# Patient Record
Sex: FEMALE | Race: WHITE | Employment: FULL TIME | URBAN - METROPOLITAN AREA
[De-identification: names, ages, dates, MRNs, and addresses within clinical notes are randomized per-mention and may not be internally consistent; named-entity substitution may affect disease eponyms.]

---

## 2017-03-16 ENCOUNTER — NURSE ONLY (OUTPATIENT)
Dept: PRIMARY CARE CLINIC | Age: 47
End: 2017-03-16

## 2017-03-16 DIAGNOSIS — J30.9 ALLERGIC RHINITIS, UNSPECIFIED ALLERGIC RHINITIS TRIGGER, UNSPECIFIED RHINITIS SEASONALITY: Primary | ICD-10-CM

## 2017-03-16 PROCEDURE — 96372 THER/PROPH/DIAG INJ SC/IM: CPT | Performed by: FAMILY MEDICINE

## 2017-03-16 RX ORDER — TRIAMCINOLONE ACETONIDE 40 MG/ML
80 INJECTION, SUSPENSION INTRA-ARTICULAR; INTRAMUSCULAR ONCE
Status: COMPLETED | OUTPATIENT
Start: 2017-03-16 | End: 2017-03-16

## 2017-03-16 RX ADMIN — TRIAMCINOLONE ACETONIDE 80 MG: 40 INJECTION, SUSPENSION INTRA-ARTICULAR; INTRAMUSCULAR at 15:54

## 2017-07-25 ENCOUNTER — OFFICE VISIT (OUTPATIENT)
Dept: PRIMARY CARE CLINIC | Age: 47
End: 2017-07-25

## 2017-07-25 VITALS
HEART RATE: 88 BPM | HEIGHT: 68 IN | RESPIRATION RATE: 16 BRPM | DIASTOLIC BLOOD PRESSURE: 80 MMHG | WEIGHT: 153 LBS | SYSTOLIC BLOOD PRESSURE: 114 MMHG | BODY MASS INDEX: 23.19 KG/M2 | TEMPERATURE: 97.5 F

## 2017-07-25 DIAGNOSIS — B00.1 RECURRENT COLD SORES: ICD-10-CM

## 2017-07-25 DIAGNOSIS — F32.A DEPRESSION, UNSPECIFIED DEPRESSION TYPE: ICD-10-CM

## 2017-07-25 DIAGNOSIS — M54.50 CHRONIC BILATERAL LOW BACK PAIN WITHOUT SCIATICA: ICD-10-CM

## 2017-07-25 DIAGNOSIS — G89.29 CHRONIC BILATERAL LOW BACK PAIN WITHOUT SCIATICA: ICD-10-CM

## 2017-07-25 DIAGNOSIS — F41.9 ANXIETY: Primary | ICD-10-CM

## 2017-07-25 PROCEDURE — 99214 OFFICE O/P EST MOD 30 MIN: CPT | Performed by: FAMILY MEDICINE

## 2017-07-25 PROCEDURE — G0444 DEPRESSION SCREEN ANNUAL: HCPCS | Performed by: FAMILY MEDICINE

## 2017-07-25 RX ORDER — VALACYCLOVIR HYDROCHLORIDE 1 G/1
1000 TABLET, FILM COATED ORAL 2 TIMES DAILY
Qty: 20 TABLET | Refills: 3 | Status: SHIPPED | OUTPATIENT
Start: 2017-07-25 | End: 2018-02-12 | Stop reason: SDUPTHER

## 2017-07-25 RX ORDER — METHOCARBAMOL 500 MG/1
500 TABLET, FILM COATED ORAL 4 TIMES DAILY
Qty: 90 TABLET | Refills: 3 | Status: SHIPPED | OUTPATIENT
Start: 2017-07-25 | End: 2019-03-11 | Stop reason: SDUPTHER

## 2017-07-25 RX ORDER — BUSPIRONE HYDROCHLORIDE 7.5 MG/1
7.5 TABLET ORAL 2 TIMES DAILY
Qty: 6 TABLET | Refills: 0 | Status: SHIPPED | OUTPATIENT
Start: 2017-07-25 | End: 2017-08-30 | Stop reason: SDUPTHER

## 2017-07-25 RX ORDER — BUSPIRONE HYDROCHLORIDE 15 MG/1
15 TABLET ORAL 3 TIMES DAILY PRN
Qty: 90 TABLET | Refills: 3 | Status: SHIPPED | OUTPATIENT
Start: 2017-07-25 | End: 2018-02-19 | Stop reason: ALTCHOICE

## 2017-07-25 ASSESSMENT — ENCOUNTER SYMPTOMS
NAUSEA: 0
PHOTOPHOBIA: 0
COLOR CHANGE: 0
CHEST TIGHTNESS: 0
WHEEZING: 0
FACIAL SWELLING: 0
EYE REDNESS: 0
STRIDOR: 0
EYE PAIN: 0
APNEA: 0
CONSTIPATION: 0
ABDOMINAL PAIN: 0
EYE DISCHARGE: 0
CHOKING: 0
VISUAL CHANGE: 0
DIARRHEA: 0

## 2017-07-25 ASSESSMENT — PATIENT HEALTH QUESTIONNAIRE - PHQ9
5. POOR APPETITE OR OVEREATING: 3
8. MOVING OR SPEAKING SO SLOWLY THAT OTHER PEOPLE COULD HAVE NOTICED. OR THE OPPOSITE, BEING SO FIGETY OR RESTLESS THAT YOU HAVE BEEN MOVING AROUND A LOT MORE THAN USUAL: 3
6. FEELING BAD ABOUT YOURSELF - OR THAT YOU ARE A FAILURE OR HAVE LET YOURSELF OR YOUR FAMILY DOWN: 1
9. THOUGHTS THAT YOU WOULD BE BETTER OFF DEAD, OR OF HURTING YOURSELF: 0
SUM OF ALL RESPONSES TO PHQ9 QUESTIONS 1 & 2: 4
4. FEELING TIRED OR HAVING LITTLE ENERGY: 3
1. LITTLE INTEREST OR PLEASURE IN DOING THINGS: 1
7. TROUBLE CONCENTRATING ON THINGS, SUCH AS READING THE NEWSPAPER OR WATCHING TELEVISION: 2
SUM OF ALL RESPONSES TO PHQ QUESTIONS 1-9: 19
2. FEELING DOWN, DEPRESSED OR HOPELESS: 3
3. TROUBLE FALLING OR STAYING ASLEEP: 3
10. IF YOU CHECKED OFF ANY PROBLEMS, HOW DIFFICULT HAVE THESE PROBLEMS MADE IT FOR YOU TO DO YOUR WORK, TAKE CARE OF THINGS AT HOME, OR GET ALONG WITH OTHER PEOPLE: 3

## 2017-08-30 DIAGNOSIS — F41.9 ANXIETY: ICD-10-CM

## 2017-08-30 DIAGNOSIS — F32.A DEPRESSION, UNSPECIFIED DEPRESSION TYPE: ICD-10-CM

## 2017-08-30 RX ORDER — BUSPIRONE HYDROCHLORIDE 7.5 MG/1
TABLET ORAL
Qty: 60 TABLET | Refills: 3 | Status: SHIPPED | OUTPATIENT
Start: 2017-08-30 | End: 2018-02-19 | Stop reason: DRUGHIGH

## 2018-01-11 ENCOUNTER — OFFICE VISIT (OUTPATIENT)
Dept: PRIMARY CARE CLINIC | Age: 48
End: 2018-01-11

## 2018-01-11 VITALS
HEIGHT: 67 IN | TEMPERATURE: 97.8 F | OXYGEN SATURATION: 97 % | DIASTOLIC BLOOD PRESSURE: 76 MMHG | BODY MASS INDEX: 25.9 KG/M2 | HEART RATE: 80 BPM | SYSTOLIC BLOOD PRESSURE: 112 MMHG | WEIGHT: 165 LBS | RESPIRATION RATE: 16 BRPM

## 2018-01-11 DIAGNOSIS — F32.A DEPRESSION, UNSPECIFIED DEPRESSION TYPE: ICD-10-CM

## 2018-01-11 DIAGNOSIS — R63.5 WEIGHT GAIN: ICD-10-CM

## 2018-01-11 DIAGNOSIS — F41.9 ANXIETY: Primary | ICD-10-CM

## 2018-01-11 DIAGNOSIS — R19.6 HALITOSIS: ICD-10-CM

## 2018-01-11 DIAGNOSIS — K21.9 GASTROESOPHAGEAL REFLUX DISEASE WITHOUT ESOPHAGITIS: ICD-10-CM

## 2018-01-11 DIAGNOSIS — R07.89 ATYPICAL CHEST PAIN: ICD-10-CM

## 2018-01-11 DIAGNOSIS — E78.5 DYSLIPIDEMIA: ICD-10-CM

## 2018-01-11 PROCEDURE — 99214 OFFICE O/P EST MOD 30 MIN: CPT | Performed by: FAMILY MEDICINE

## 2018-01-11 RX ORDER — PANTOPRAZOLE SODIUM 40 MG/1
40 TABLET, DELAYED RELEASE ORAL DAILY
Qty: 30 TABLET | Refills: 0 | Status: SHIPPED | OUTPATIENT
Start: 2018-01-11 | End: 2018-02-19

## 2018-01-11 RX ORDER — PHENTERMINE HYDROCHLORIDE 37.5 MG/1
37.5 CAPSULE ORAL EVERY MORNING
Qty: 30 CAPSULE | Refills: 0 | Status: SHIPPED | OUTPATIENT
Start: 2018-01-11 | End: 2018-02-19 | Stop reason: SDUPTHER

## 2018-01-11 RX ORDER — VENLAFAXINE HYDROCHLORIDE 75 MG/1
75 CAPSULE, EXTENDED RELEASE ORAL DAILY
Qty: 30 CAPSULE | Refills: 1 | Status: SHIPPED | OUTPATIENT
Start: 2018-01-11 | End: 2018-02-19 | Stop reason: DRUGHIGH

## 2018-01-11 RX ORDER — VENLAFAXINE HYDROCHLORIDE 37.5 MG/1
37.5 CAPSULE, EXTENDED RELEASE ORAL DAILY
Qty: 7 CAPSULE | Refills: 0 | Status: SHIPPED | OUTPATIENT
Start: 2018-01-11 | End: 2018-02-19 | Stop reason: DRUGHIGH

## 2018-01-11 ASSESSMENT — ENCOUNTER SYMPTOMS
EYE DISCHARGE: 0
FACIAL SWELLING: 0
ABDOMINAL PAIN: 1
PHOTOPHOBIA: 0
NAUSEA: 0
CONSTIPATION: 0
CHOKING: 0
EYE REDNESS: 0
WHEEZING: 0
DIARRHEA: 0
CHEST TIGHTNESS: 0
STRIDOR: 0
APNEA: 0
EYE PAIN: 0
COLOR CHANGE: 0

## 2018-01-11 NOTE — PROGRESS NOTES
exudate. Eyes: Conjunctivae and EOM are normal. Pupils are equal, round, and reactive to light. Right eye exhibits no discharge. Left eye exhibits no discharge. No scleral icterus. Neck: Normal range of motion. Neck supple. No tracheal deviation present. No thyromegaly present. Cardiovascular: Normal rate, regular rhythm, normal heart sounds and intact distal pulses. Exam reveals no gallop and no friction rub. No murmur heard. Pulmonary/Chest: Effort normal and breath sounds normal. No respiratory distress. She has no wheezes. She has no rales. She exhibits no tenderness. Abdominal: Soft. Bowel sounds are normal. She exhibits no distension and no mass. There is no tenderness. There is no rebound and no guarding. Musculoskeletal: She exhibits no edema. Lymphadenopathy:     She has no cervical adenopathy. Neurological: She is alert and oriented to person, place, and time. Coordination normal.   Skin: Skin is warm and dry. No rash noted. She is not diaphoretic. No erythema. Psychiatric: She has a normal mood and affect. Her behavior is normal. Judgment and thought content normal.   Nursing note and vitals reviewed. Assessment:     1. Anxiety  venlafaxine (EFFEXOR XR) 37.5 MG extended release capsule    venlafaxine (EFFEXOR XR) 75 MG extended release capsule   2. Depression, unspecified depression type  venlafaxine (EFFEXOR XR) 37.5 MG extended release capsule    venlafaxine (EFFEXOR XR) 75 MG extended release capsule   3. Weight gain  phentermine 37.5 MG capsule   4. Atypical chest pain  NM Myocardial Spect Rest Exercise or Rx   5. Halitosis  H. Pylori Breath Test   6. Dyslipidemia, 224, HDL 76  phentermine 37.5 MG capsule   7.  Gastroesophageal reflux disease without esophagitis         Plan:      Orders Placed This Encounter   Procedures    NM Myocardial Spect Rest Exercise or Rx     Standing Status:   Future     Standing Expiration Date:   1/11/2019     Order Specific Question:   Reason for

## 2018-01-23 ENCOUNTER — HOSPITAL ENCOUNTER (OUTPATIENT)
Dept: NUCLEAR MEDICINE | Age: 48
Discharge: HOME | End: 2018-01-23
Payer: COMMERCIAL

## 2018-01-23 DIAGNOSIS — R07.89 ATYPICAL CHEST PAIN: ICD-10-CM

## 2018-01-23 PROCEDURE — 3430000000 HC RX DIAGNOSTIC RADIOPHARMACEUTICAL: Performed by: FAMILY MEDICINE

## 2018-01-23 PROCEDURE — 2580000003 HC RX 258: Performed by: FAMILY MEDICINE

## 2018-01-23 PROCEDURE — A9502 TC99M TETROFOSMIN: HCPCS | Performed by: FAMILY MEDICINE

## 2018-01-23 PROCEDURE — 93017 CV STRESS TEST TRACING ONLY: CPT

## 2018-01-23 PROCEDURE — 78452 HT MUSCLE IMAGE SPECT MULT: CPT

## 2018-01-23 RX ORDER — SODIUM CHLORIDE 0.9 % (FLUSH) 0.9 %
10 SYRINGE (ML) INJECTION PRN
Status: DISCONTINUED | OUTPATIENT
Start: 2018-01-23 | End: 2018-01-26 | Stop reason: HOSPADM

## 2018-01-23 RX ADMIN — TETROFOSMIN 11.2 MILLICURIE: 0.23 INJECTION, POWDER, LYOPHILIZED, FOR SOLUTION INTRAVENOUS at 08:30

## 2018-01-23 RX ADMIN — TETROFOSMIN 32.1 MILLICURIE: 0.23 INJECTION, POWDER, LYOPHILIZED, FOR SOLUTION INTRAVENOUS at 09:56

## 2018-01-23 RX ADMIN — Medication 10 ML: at 08:43

## 2018-01-23 RX ADMIN — Medication 10 ML: at 09:56

## 2018-01-23 NOTE — PROCEDURES
[address]                       [address], [address] [facility]                                CARDIAC STRESS TEST    PATIENT NAME: Adriana Key                      :        1970  MED REC NO:   [id number]                            ROOM:  ACCOUNT NO:   [de-identified]                           ADMIT DATE: 2018  PROVIDER:     Anil Conn MD    CARDIOVASCULAR DIAGNOSTIC DEPARTMENT    DATE OF STUDY:  2018    EXERCISE MYOVIEW CARDIAC PERFUSION STRESS TESTING    INDICATIONS:  Chest pain. TECHNIQUE RESULTS:  Standard exercise Myoview cardiac perfusion stress test  protocol was followed. Rest and stress tomographic images were obtained. Left ventricular ejection fraction and gated wall motion were acquired. RESULTS:  Resting heart rate and blood pressure were 58 beats per minute  and 148/76 respectively. Resting electrocardiogram revealed sinus rhythm. The patient exercised for 8 minutes achieving 92% of maximum predicted  heart rate for age, 9.3 METs. Maximum heart rate was 156 beats per minute. Maximum blood pressure was 142/78. The patient had no chest pain symptoms,  ischemic EKG changes, or dysrhythmias. There was a normal recovery phase. Rest and stress tomographic images were reviewed and revealed normal  perfusion. There was no evidence of myocardial ischemia or myocardial  infarction by perfusion imaging. Overall, left ventricular systolic  function appeared to be normal without obvious focal wall motion  abnormalities. Left ventricular ejection fraction was 73%. Image quality  was good. IMPRESSION:  1. Negative Lexiscan myocardial perfusion stress test for significant  coronary artery disease. 2.  No evidence of myocardial ischemia by perfusion imaging. 3.  Normal left ventricular systolic function, left ventricular ejection  fraction 73%. COMMENTS:  Clinical correlation is advised.       ROSA VIGIL,

## 2018-01-29 ENCOUNTER — TELEPHONE (OUTPATIENT)
Dept: PRIMARY CARE CLINIC | Age: 48
End: 2018-01-29

## 2018-02-12 DIAGNOSIS — B00.1 RECURRENT COLD SORES: ICD-10-CM

## 2018-02-12 RX ORDER — VALACYCLOVIR HYDROCHLORIDE 1 G/1
1000 TABLET, FILM COATED ORAL 2 TIMES DAILY
Qty: 20 TABLET | Refills: 3 | Status: SHIPPED | OUTPATIENT
Start: 2018-02-12 | End: 2018-10-29

## 2018-02-19 ENCOUNTER — OFFICE VISIT (OUTPATIENT)
Dept: PRIMARY CARE CLINIC | Age: 48
End: 2018-02-19
Payer: COMMERCIAL

## 2018-02-19 VITALS
HEART RATE: 104 BPM | RESPIRATION RATE: 14 BRPM | WEIGHT: 150 LBS | BODY MASS INDEX: 23.54 KG/M2 | DIASTOLIC BLOOD PRESSURE: 82 MMHG | TEMPERATURE: 97.8 F | SYSTOLIC BLOOD PRESSURE: 102 MMHG | HEIGHT: 67 IN

## 2018-02-19 DIAGNOSIS — F41.9 ANXIETY: ICD-10-CM

## 2018-02-19 DIAGNOSIS — F32.A DEPRESSION, UNSPECIFIED DEPRESSION TYPE: ICD-10-CM

## 2018-02-19 DIAGNOSIS — E78.5 DYSLIPIDEMIA: ICD-10-CM

## 2018-02-19 DIAGNOSIS — R63.5 WEIGHT GAIN: Primary | ICD-10-CM

## 2018-02-19 PROCEDURE — 99213 OFFICE O/P EST LOW 20 MIN: CPT | Performed by: FAMILY MEDICINE

## 2018-02-19 RX ORDER — PHENTERMINE HYDROCHLORIDE 37.5 MG/1
37.5 CAPSULE ORAL EVERY MORNING
Qty: 30 CAPSULE | Refills: 0 | Status: SHIPPED | OUTPATIENT
Start: 2018-02-19 | End: 2018-03-19

## 2018-02-19 RX ORDER — VENLAFAXINE HYDROCHLORIDE 37.5 MG/1
37.5 CAPSULE, EXTENDED RELEASE ORAL DAILY
Qty: 30 CAPSULE | Refills: 2 | Status: SHIPPED | OUTPATIENT
Start: 2018-02-19 | End: 2018-03-19 | Stop reason: SDUPTHER

## 2018-02-19 ASSESSMENT — ENCOUNTER SYMPTOMS
DIARRHEA: 0
CHOKING: 0
EYE DISCHARGE: 0
WHEEZING: 0
CHEST TIGHTNESS: 0
FACIAL SWELLING: 0
APNEA: 0
ABDOMINAL PAIN: 0
CONSTIPATION: 0
EYE REDNESS: 0
PHOTOPHOBIA: 0
COLOR CHANGE: 0
EYE PAIN: 0
NAUSEA: 0
STRIDOR: 0

## 2018-02-19 NOTE — PROGRESS NOTES
Subjective:      Patient ID: Anca Michelle is a 50 y.o. female who presents today for:  Chief Complaint   Patient presents with    Anxiety     f/u on anxiety. Patient was started on Effexor XR last visit and is now taking 75mg. Patient states it is giving her sexual side effects (no sexual drive or ability to climax). She also c/o dry mouth. She states she still feels down but not as anxious.  Weight Loss     f/u on Phentermine for weight loss. She is down -15lbs since last visit. She will get her 2nd script today.  Follow Up After Procedure     follow up on stress test done 01/23/18. HPI    Anxiety  Pt is here today for a 5 week follow-up on anxiety. She was started on Effexor XR 37.5 MG x 1 week & then increased to the 75 MG so she has been on this dose X 4 weeks. States that it is giving her sexual side effects (no sex drive or ability to climax). She also complains of a dry mouth. States that she still feels down but isn't as anxious as she used to be. Weight Loss  Pt is here today for a 1 month follow-up on Adipex for weight loss. She is down -15 lbs since last visit. She will be getting her 2nd script today. States that she has changed her eating habits & hasn't been eating as much. Her stress test from 1/23/18 was discussed with her today.     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   Psychiatric: She has a normal mood and affect. Her behavior is normal. Judgment and thought content normal.       Assessment:     1. Weight gain  phentermine 37.5 MG capsule   2. Dyslipidemia, 224, HDL 76  phentermine 37.5 MG capsule   3. Anxiety  venlafaxine (EFFEXOR XR) 37.5 MG extended release capsule   4. Depression, unspecified depression type  venlafaxine (EFFEXOR XR) 37.5 MG extended release capsule       Plan:      No orders of the defined types were placed in this encounter. Orders Placed This Encounter   Medications    phentermine 37.5 MG capsule     Sig: Take 1 capsule by mouth every morning for 30 days Body mass index is 23.49 kg/m². w / co-morbid risk factors. Dispense:  30 capsule     Refill:  0    venlafaxine (EFFEXOR XR) 37.5 MG extended release capsule     Sig: Take 1 capsule by mouth daily     Dispense:  30 capsule     Refill:  2       Controlled Substances Monitoring:      No Follow-up on file. I, HANS Ham  , am scribing for and in the presence of [employer], DO. Electronically signed by :  HANS Ham [employer], DO, personally performed the services described in this documentation, as scribed by HANS Ham   in my presence, and it is both accurate and complete.  Electronically signed by: Bossman Brown DO    2/19/18 2:54 PM    Bossman Brown DO

## 2018-02-20 ENCOUNTER — TELEPHONE (OUTPATIENT)
Dept: PRIMARY CARE CLINIC | Age: 48
End: 2018-02-20

## 2018-03-19 ENCOUNTER — OFFICE VISIT (OUTPATIENT)
Dept: PRIMARY CARE CLINIC | Age: 48
End: 2018-03-19
Payer: COMMERCIAL

## 2018-03-19 VITALS
HEART RATE: 94 BPM | DIASTOLIC BLOOD PRESSURE: 78 MMHG | RESPIRATION RATE: 14 BRPM | BODY MASS INDEX: 22.29 KG/M2 | HEIGHT: 67 IN | WEIGHT: 142 LBS | TEMPERATURE: 97.8 F | SYSTOLIC BLOOD PRESSURE: 114 MMHG

## 2018-03-19 DIAGNOSIS — F32.A DEPRESSION, UNSPECIFIED DEPRESSION TYPE: ICD-10-CM

## 2018-03-19 DIAGNOSIS — J30.9 CHRONIC ALLERGIC RHINITIS, UNSPECIFIED SEASONALITY, UNSPECIFIED TRIGGER: ICD-10-CM

## 2018-03-19 DIAGNOSIS — F41.9 ANXIETY: ICD-10-CM

## 2018-03-19 DIAGNOSIS — R63.4 WEIGHT LOSS: Primary | ICD-10-CM

## 2018-03-19 PROCEDURE — 99213 OFFICE O/P EST LOW 20 MIN: CPT | Performed by: FAMILY MEDICINE

## 2018-03-19 PROCEDURE — 96372 THER/PROPH/DIAG INJ SC/IM: CPT | Performed by: FAMILY MEDICINE

## 2018-03-19 RX ORDER — PHENTERMINE HYDROCHLORIDE 37.5 MG/1
37.5 TABLET ORAL
Qty: 30 TABLET | Refills: 0 | Status: SHIPPED | OUTPATIENT
Start: 2018-03-19 | End: 2018-10-01 | Stop reason: SDUPTHER

## 2018-03-19 RX ORDER — VENLAFAXINE HYDROCHLORIDE 37.5 MG/1
37.5 CAPSULE, EXTENDED RELEASE ORAL DAILY
Qty: 12 CAPSULE | Refills: 0 | Status: SHIPPED | OUTPATIENT
Start: 2018-03-19 | End: 2018-04-16

## 2018-03-19 RX ORDER — TRIAMCINOLONE ACETONIDE 40 MG/ML
80 INJECTION, SUSPENSION INTRA-ARTICULAR; INTRAMUSCULAR ONCE
Status: COMPLETED | OUTPATIENT
Start: 2018-03-19 | End: 2018-03-19

## 2018-03-19 RX ADMIN — TRIAMCINOLONE ACETONIDE 80 MG: 40 INJECTION, SUSPENSION INTRA-ARTICULAR; INTRAMUSCULAR at 18:41

## 2018-03-19 ASSESSMENT — ENCOUNTER SYMPTOMS
COLOR CHANGE: 0
APNEA: 0
EYE REDNESS: 0
STRIDOR: 0
EYE PAIN: 0
CONSTIPATION: 0
CHEST TIGHTNESS: 0
EYE DISCHARGE: 0
PHOTOPHOBIA: 0
NAUSEA: 0
WHEEZING: 0
CHOKING: 0
FACIAL SWELLING: 0
ABDOMINAL PAIN: 0
DIARRHEA: 0

## 2018-03-19 NOTE — PROGRESS NOTES
FRACTURE SURGERY       Family History   Problem Relation Age of Onset    Other Father      AR    Other Brother 43     MI DRUGS    Rheum Arthritis Mother     Cancer Maternal Aunt      breast, lung    Heart Failure Maternal Aunt     Rheum Arthritis Maternal Uncle     Heart Disease Maternal Grandmother     Heart Disease Maternal Grandfather     Heart Disease Paternal Grandmother     Stroke Paternal Grandmother     Heart Disease Paternal Grandfather      Social History     Social History    Marital status:      Spouse name: N/A    Number of children: N/A    Years of education: N/A     Occupational History    Not on file. Social History Main Topics    Smoking status: Never Smoker    Smokeless tobacco: Never Used    Alcohol use Yes      Comment: socially    Drug use: No    Sexual activity: Not on file     Other Topics Concern    Not on file     Social History Narrative    No narrative on file     Allergies:  Biaxin [clarithromycin]    Review of Systems   Constitutional: Negative for activity change, appetite change, chills, diaphoresis and fever. HENT: Negative for congestion, ear discharge, ear pain, facial swelling, hearing loss and mouth sores. Eyes: Negative for photophobia, pain, discharge and redness. Respiratory: Negative for apnea, choking, chest tightness, wheezing and stridor. Cardiovascular: Negative for chest pain, palpitations and leg swelling. Gastrointestinal: Negative for abdominal pain, constipation, diarrhea and nausea. Endocrine: Negative for cold intolerance, heat intolerance, polydipsia and polyphagia. Genitourinary: Negative for dysuria and frequency. Musculoskeletal: Negative for gait problem, joint swelling, neck pain and neck stiffness. Skin: Negative for color change, pallor, rash and wound. Allergic/Immunologic: Negative for immunocompromised state.    Neurological: Negative for tremors, syncope, facial asymmetry, speech difficulty and

## 2018-04-16 ENCOUNTER — OFFICE VISIT (OUTPATIENT)
Dept: PRIMARY CARE CLINIC | Age: 48
End: 2018-04-16
Payer: COMMERCIAL

## 2018-04-16 VITALS
RESPIRATION RATE: 15 BRPM | BODY MASS INDEX: 21.82 KG/M2 | WEIGHT: 139 LBS | HEART RATE: 107 BPM | TEMPERATURE: 97.6 F | SYSTOLIC BLOOD PRESSURE: 108 MMHG | HEIGHT: 67 IN | OXYGEN SATURATION: 98 % | DIASTOLIC BLOOD PRESSURE: 72 MMHG

## 2018-04-16 DIAGNOSIS — R63.4 WEIGHT LOSS: ICD-10-CM

## 2018-04-16 DIAGNOSIS — F90.9 ATTENTION DEFICIT HYPERACTIVITY DISORDER (ADHD), UNSPECIFIED ADHD TYPE: ICD-10-CM

## 2018-04-16 DIAGNOSIS — F32.A DEPRESSION, UNSPECIFIED DEPRESSION TYPE: Primary | ICD-10-CM

## 2018-04-16 PROCEDURE — 99213 OFFICE O/P EST LOW 20 MIN: CPT | Performed by: FAMILY MEDICINE

## 2018-04-16 ASSESSMENT — ENCOUNTER SYMPTOMS
ABDOMINAL PAIN: 0
CHEST TIGHTNESS: 0
STRIDOR: 0
EYE PAIN: 0
NAUSEA: 0
EYE DISCHARGE: 0
PHOTOPHOBIA: 0
EYE REDNESS: 0
FACIAL SWELLING: 0
CONSTIPATION: 0
COLOR CHANGE: 0
DIARRHEA: 0
APNEA: 0
CHOKING: 0
WHEEZING: 0

## 2018-04-18 RX ORDER — VILAZODONE HYDROCHLORIDE 20 MG/1
20 TABLET ORAL DAILY
Qty: 30 TABLET | Refills: 1 | Status: SHIPPED | OUTPATIENT
Start: 2018-04-18 | End: 2018-10-29

## 2018-04-19 ENCOUNTER — TELEPHONE (OUTPATIENT)
Dept: PRIMARY CARE CLINIC | Age: 48
End: 2018-04-19

## 2018-04-30 ENCOUNTER — TELEPHONE (OUTPATIENT)
Dept: PRIMARY CARE CLINIC | Age: 48
End: 2018-04-30

## 2018-04-30 DIAGNOSIS — F32.A DEPRESSION, UNSPECIFIED DEPRESSION TYPE: Primary | ICD-10-CM

## 2018-04-30 RX ORDER — DESVENLAFAXINE 25 MG/1
25 TABLET, EXTENDED RELEASE ORAL DAILY
Qty: 30 TABLET | Refills: 3 | Status: SHIPPED | OUTPATIENT
Start: 2018-04-30 | End: 2018-10-29

## 2018-05-01 ENCOUNTER — TELEPHONE (OUTPATIENT)
Dept: PRIMARY CARE CLINIC | Age: 48
End: 2018-05-01

## 2018-05-03 RX ORDER — BUSPIRONE HYDROCHLORIDE 7.5 MG/1
7.5 TABLET ORAL 2 TIMES DAILY
Qty: 14 TABLET | Refills: 2 | Status: SHIPPED | OUTPATIENT
Start: 2018-05-03 | End: 2018-05-10

## 2018-05-03 RX ORDER — BUSPIRONE HYDROCHLORIDE 15 MG/1
15 TABLET ORAL 2 TIMES DAILY
Qty: 90 TABLET | Refills: 3 | Status: SHIPPED | OUTPATIENT
Start: 2018-05-03 | End: 2019-02-27 | Stop reason: SDUPTHER

## 2018-05-08 ENCOUNTER — TELEPHONE (OUTPATIENT)
Dept: PRIMARY CARE CLINIC | Age: 48
End: 2018-05-08

## 2018-05-10 ENCOUNTER — TELEPHONE (OUTPATIENT)
Dept: PRIMARY CARE CLINIC | Age: 48
End: 2018-05-10

## 2018-09-05 ENCOUNTER — TELEPHONE (OUTPATIENT)
Dept: PRIMARY CARE CLINIC | Age: 48
End: 2018-09-05

## 2018-09-06 RX ORDER — SULFAMETHOXAZOLE AND TRIMETHOPRIM 800; 160 MG/1; MG/1
1 TABLET ORAL 2 TIMES DAILY
Qty: 20 TABLET | Refills: 0 | Status: SHIPPED | OUTPATIENT
Start: 2018-09-06 | End: 2018-09-16

## 2018-10-01 ENCOUNTER — OFFICE VISIT (OUTPATIENT)
Dept: PRIMARY CARE CLINIC | Age: 48
End: 2018-10-01
Payer: COMMERCIAL

## 2018-10-01 VITALS
HEIGHT: 67 IN | HEART RATE: 65 BPM | TEMPERATURE: 98 F | OXYGEN SATURATION: 97 % | WEIGHT: 158 LBS | SYSTOLIC BLOOD PRESSURE: 122 MMHG | DIASTOLIC BLOOD PRESSURE: 68 MMHG | BODY MASS INDEX: 24.8 KG/M2 | RESPIRATION RATE: 14 BRPM

## 2018-10-01 DIAGNOSIS — R63.4 WEIGHT LOSS: ICD-10-CM

## 2018-10-01 DIAGNOSIS — J34.89 NASAL LESION: Primary | ICD-10-CM

## 2018-10-01 PROCEDURE — 99213 OFFICE O/P EST LOW 20 MIN: CPT | Performed by: FAMILY MEDICINE

## 2018-10-01 RX ORDER — PHENTERMINE HYDROCHLORIDE 37.5 MG/1
37.5 TABLET ORAL
Qty: 30 TABLET | Refills: 0 | Status: SHIPPED | OUTPATIENT
Start: 2018-10-01 | End: 2018-10-29 | Stop reason: SDUPTHER

## 2018-10-01 RX ORDER — TOPIRAMATE 25 MG/1
25 TABLET ORAL 2 TIMES DAILY
Qty: 60 TABLET | Refills: 1 | COMMUNITY
Start: 2018-10-01 | End: 2018-10-01 | Stop reason: SDUPTHER

## 2018-10-01 RX ORDER — TOPIRAMATE 25 MG/1
25 TABLET ORAL 2 TIMES DAILY
Qty: 60 TABLET | Refills: 1 | Status: SHIPPED | OUTPATIENT
Start: 2018-10-01 | End: 2018-11-26

## 2018-10-01 ASSESSMENT — ENCOUNTER SYMPTOMS
SHORTNESS OF BREATH: 0
RESPIRATORY NEGATIVE: 1
DIARRHEA: 0
EYES NEGATIVE: 1
GASTROINTESTINAL NEGATIVE: 1
CONSTIPATION: 0
PHOTOPHOBIA: 0
WHEEZING: 0
EYE ITCHING: 0
ABDOMINAL PAIN: 0
EYE REDNESS: 0
BACK PAIN: 0

## 2018-10-02 ENCOUNTER — TELEPHONE (OUTPATIENT)
Dept: PRIMARY CARE CLINIC | Age: 48
End: 2018-10-02

## 2018-10-02 NOTE — TELEPHONE ENCOUNTER
She cant get the adipex until 10/20/18. This is an FYI. She said her BMI was listed wrong. She will call for another script by 10/20/18.

## 2018-10-20 DIAGNOSIS — R63.4 WEIGHT LOSS: ICD-10-CM

## 2018-10-20 RX ORDER — PHENTERMINE HYDROCHLORIDE 37.5 MG/1
37.5 TABLET ORAL
Qty: 30 TABLET | Refills: 0
Start: 2018-10-20 | End: 2018-11-19

## 2018-10-29 ENCOUNTER — OFFICE VISIT (OUTPATIENT)
Dept: PRIMARY CARE CLINIC | Age: 48
End: 2018-10-29
Payer: COMMERCIAL

## 2018-10-29 VITALS
OXYGEN SATURATION: 98 % | HEART RATE: 68 BPM | BODY MASS INDEX: 27.47 KG/M2 | WEIGHT: 175 LBS | TEMPERATURE: 97.5 F | RESPIRATION RATE: 16 BRPM | HEIGHT: 67 IN | SYSTOLIC BLOOD PRESSURE: 130 MMHG | DIASTOLIC BLOOD PRESSURE: 80 MMHG

## 2018-10-29 DIAGNOSIS — E78.5 DYSLIPIDEMIA: Primary | ICD-10-CM

## 2018-10-29 DIAGNOSIS — J30.9 ALLERGIC RHINITIS, UNSPECIFIED SEASONALITY, UNSPECIFIED TRIGGER: ICD-10-CM

## 2018-10-29 DIAGNOSIS — Z82.49 FH: CAD (CORONARY ARTERY DISEASE): ICD-10-CM

## 2018-10-29 DIAGNOSIS — R63.4 WEIGHT LOSS: ICD-10-CM

## 2018-10-29 PROCEDURE — 99213 OFFICE O/P EST LOW 20 MIN: CPT | Performed by: FAMILY MEDICINE

## 2018-10-29 RX ORDER — PHENTERMINE HYDROCHLORIDE 37.5 MG/1
37.5 TABLET ORAL
Qty: 30 TABLET | Refills: 0 | Status: SHIPPED | OUTPATIENT
Start: 2018-10-29 | End: 2018-11-26 | Stop reason: SDUPTHER

## 2018-10-29 RX ORDER — MONTELUKAST SODIUM 10 MG/1
10 TABLET ORAL DAILY
Qty: 30 TABLET | Refills: 3 | Status: SHIPPED | OUTPATIENT
Start: 2018-10-29

## 2018-10-29 ASSESSMENT — ENCOUNTER SYMPTOMS
CHEST TIGHTNESS: 0
FACIAL SWELLING: 0
APNEA: 0
CHOKING: 0
EYE DISCHARGE: 0
PHOTOPHOBIA: 0
EYE REDNESS: 0
COLOR CHANGE: 0
ABDOMINAL PAIN: 0
EYE PAIN: 0
WHEEZING: 0
DIARRHEA: 0
STRIDOR: 0
NAUSEA: 0
CONSTIPATION: 0

## 2018-10-30 ENCOUNTER — TELEPHONE (OUTPATIENT)
Dept: PRIMARY CARE CLINIC | Age: 48
End: 2018-10-30

## 2018-10-30 RX ORDER — ATORVASTATIN CALCIUM 10 MG/1
10 TABLET, FILM COATED ORAL DAILY
Qty: 90 TABLET | Refills: 3 | Status: SHIPPED | OUTPATIENT
Start: 2018-10-30

## 2018-11-26 ENCOUNTER — OFFICE VISIT (OUTPATIENT)
Dept: PRIMARY CARE CLINIC | Age: 48
End: 2018-11-26
Payer: COMMERCIAL

## 2018-11-26 VITALS
HEIGHT: 67 IN | OXYGEN SATURATION: 98 % | SYSTOLIC BLOOD PRESSURE: 128 MMHG | DIASTOLIC BLOOD PRESSURE: 68 MMHG | WEIGHT: 163 LBS | HEART RATE: 82 BPM | RESPIRATION RATE: 16 BRPM | BODY MASS INDEX: 25.58 KG/M2 | TEMPERATURE: 98.1 F

## 2018-11-26 DIAGNOSIS — R53.83 OTHER FATIGUE: Primary | ICD-10-CM

## 2018-11-26 DIAGNOSIS — E78.5 DYSLIPIDEMIA: ICD-10-CM

## 2018-11-26 DIAGNOSIS — R63.4 WEIGHT LOSS: ICD-10-CM

## 2018-11-26 PROCEDURE — 99213 OFFICE O/P EST LOW 20 MIN: CPT | Performed by: FAMILY MEDICINE

## 2018-11-26 RX ORDER — PHENTERMINE HYDROCHLORIDE 37.5 MG/1
37.5 TABLET ORAL
Qty: 30 TABLET | Refills: 0 | Status: SHIPPED | OUTPATIENT
Start: 2018-11-26 | End: 2018-12-26

## 2018-11-26 ASSESSMENT — ENCOUNTER SYMPTOMS
CONSTIPATION: 0
EYE PAIN: 0
COUGH: 0
EYE REDNESS: 0
COLOR CHANGE: 0
EYE DISCHARGE: 0
FACIAL SWELLING: 0
APNEA: 0
WHEEZING: 0
NAUSEA: 0
SWOLLEN GLANDS: 0
ABDOMINAL PAIN: 0
CHOKING: 0
SORE THROAT: 0
CHEST TIGHTNESS: 0
DIARRHEA: 0
STRIDOR: 0
PHOTOPHOBIA: 0
VISUAL CHANGE: 0
VOMITING: 0
CHANGE IN BOWEL HABIT: 0

## 2018-11-26 NOTE — PROGRESS NOTES
Subjective:      Patient ID: Darylene Ranch is a 46 y.o. female who presents today for:  Chief Complaint   Patient presents with    Weight Loss     Pt. is here today for a follow up on weightloss, She has lost 12lbs with the first month. She is in need of 2nd  Adipex. She claims the only side effect is dry mouth.  Fatigue     She states she is having some fatigue. She would like to have thyroid checked.  Health Maintenance     Pt. denies flu vacc. Cervical Cancer Screen and Mammo are going to be done tomorrow. Weight Loss  Patient is here today for f/u on Adipex. She is down -12lbs since last visit and will get her 2nd script today. She admits to dry mouth as a side effect of the medication. Fatigue   This is a new problem. The current episode started more than 1 month ago. The problem occurs constantly. Associated symptoms include fatigue. Pertinent negatives include no abdominal pain, anorexia, arthralgias, change in bowel habit, chest pain, chills, congestion, coughing, diaphoresis, fever, headaches, joint swelling, myalgias, nausea, neck pain, numbness, rash, sore throat, swollen glands, urinary symptoms, vertigo, visual change, vomiting or weakness.         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    Cervical cancer ([address]     Change in stool 1/21/2014    Constipation, chronic     Cystic fibroadenosis of breast     right breast mass benign    Depression     Dyslipidemia, 224, HDL 76 12/5/2015    H/O hematuria 2/10/2014    Hip pain 9/19/2012    Hyperlipidemia     Joint pain in fingers of left hand 12/5/2015    Left hand pain 1/25/2016    OA (osteoarthritis) 9/19/2012    Oligomenorrhea 9/19/2012    Papilloma of left breast 10/2/2015    Post-menopausal 10/16/2012    Recurrent cold sores 1/21/2014    Trochanteric bursitis 9/19/2012    UTI (urinary tract infection) 1/21/2014    Weight gain 12/5/2015     Past Surgical History:   Procedure Laterality Date    BREAST BIOPSY  1997    right,  FCBD    BREAST CYST EXCISION Left 11/3/15    Papilloma excision    COLONOSCOPY  02/17/14    DR. CAMPBELL    ENDOMETRIAL ABLATION  02/07    MANDIBLE FRACTURE SURGERY       Family History   Problem Relation Age of Onset    Other Father         AR    Other Brother 43        MI DRUGS    Rheum Arthritis Mother     Cancer Maternal Aunt         breast, lung    Heart Failure Maternal Aunt     Rheum Arthritis Maternal Uncle     Heart Disease Maternal Grandmother     Heart Disease Maternal Grandfather     Heart Disease Paternal Grandmother     Stroke Paternal Grandmother     Heart Disease Paternal Grandfather      Social History     Social History    Marital status:      Spouse name: N/A    Number of children: N/A    Years of education: N/A     Occupational History    Not on file. Social History Main Topics    Smoking status: Never Smoker    Smokeless tobacco: Never Used    Alcohol use Yes      Comment: socially    Drug use: No    Sexual activity: Not on file     Other Topics Concern    Not on file     Social History Narrative    No narrative on file     Allergies:  Biaxin [clarithromycin]    Review of Systems   Constitutional: Positive for fatigue. Negative for activity change, appetite change, chills, diaphoresis and fever. HENT: Negative for congestion, ear discharge, ear pain, facial swelling, hearing loss, mouth sores and sore throat. Eyes: Negative for photophobia, pain, discharge and redness. Respiratory: Negative for apnea, cough, choking, chest tightness, wheezing and stridor. Cardiovascular: Negative for chest pain, palpitations and leg swelling.    Gastrointestinal: Negative for abdominal pain, anorexia, change in bowel habit, constipation, diarrhea, nausea and vomiting. Endocrine: Negative for cold intolerance, heat intolerance, polydipsia and polyphagia. Genitourinary: Negative for dysuria and frequency. Musculoskeletal: Negative for arthralgias, gait problem, joint swelling, myalgias, neck pain and neck stiffness. Skin: Negative for color change, pallor, rash and wound. Allergic/Immunologic: Negative for immunocompromised state. Neurological: Negative for vertigo, tremors, syncope, facial asymmetry, speech difficulty, weakness, numbness and headaches. Psychiatric/Behavioral: Negative for confusion and hallucinations. The patient is not nervous/anxious and is not hyperactive. Objective:   /68   Pulse 82   Temp 98.1 °F (36.7 °C) (Oral)   Resp 16   Ht 5' 7\" (1.702 m)   Wt 163 lb (73.9 kg)   SpO2 98%   BMI 25.53 kg/m²     Physical Exam   Constitutional: She is oriented to person, place, and time. She appears well-developed and well-nourished. HENT:   Head: Normocephalic and atraumatic. Nose: Nose normal.   Eyes: Pupils are equal, round, and reactive to light. Conjunctivae and EOM are normal. No scleral icterus. Neck: Normal range of motion. Neck supple. Cardiovascular: Normal rate, regular rhythm and normal heart sounds. Exam reveals no gallop and no friction rub. No murmur heard. Pulmonary/Chest: Effort normal and breath sounds normal. No respiratory distress. She has no wheezes. She has no rales. She exhibits no tenderness. Neurological: She is alert and oriented to person, place, and time. Skin: Skin is warm and dry. No rash noted. No erythema. No pallor. Psychiatric: She has a normal mood and affect. Her behavior is normal. Judgment normal.   Nursing note and vitals reviewed. Assessment:      Diagnosis Orders   1. Other fatigue  TSH without Reflex    T4, Free    T4    CBC Auto Differential    Comprehensive Metabolic Panel   2. Weight loss  phentermine (ADIPEX-P) 37.5 MG tablet   3.

## 2018-11-27 ENCOUNTER — TELEPHONE (OUTPATIENT)
Dept: PRIMARY CARE CLINIC | Age: 48
End: 2018-11-27

## 2019-01-08 DIAGNOSIS — R19.6 HALITOSIS: ICD-10-CM

## 2019-01-08 DIAGNOSIS — E78.5 DYSLIPIDEMIA: ICD-10-CM

## 2019-01-08 DIAGNOSIS — R53.83 OTHER FATIGUE: ICD-10-CM

## 2019-01-08 LAB
ALBUMIN SERPL-MCNC: 4.6 G/DL (ref 3.9–4.9)
ALP BLD-CCNC: 64 U/L (ref 40–130)
ALT SERPL-CCNC: 23 U/L (ref 0–33)
ANION GAP SERPL CALCULATED.3IONS-SCNC: 15 MEQ/L (ref 7–13)
AST SERPL-CCNC: 23 U/L (ref 0–35)
BASOPHILS ABSOLUTE: 0 K/UL (ref 0–0.2)
BASOPHILS RELATIVE PERCENT: 1.1 %
BILIRUB SERPL-MCNC: 0.3 MG/DL (ref 0–1.2)
BUN BLDV-MCNC: 14 MG/DL (ref 6–20)
CALCIUM SERPL-MCNC: 9.4 MG/DL (ref 8.6–10.2)
CHLORIDE BLD-SCNC: 103 MEQ/L (ref 98–107)
CHOLESTEROL, TOTAL: 188 MG/DL (ref 0–199)
CO2: 25 MEQ/L (ref 22–29)
CREAT SERPL-MCNC: 0.7 MG/DL (ref 0.5–0.9)
EOSINOPHILS ABSOLUTE: 0.1 K/UL (ref 0–0.7)
EOSINOPHILS RELATIVE PERCENT: 5.1 %
GFR AFRICAN AMERICAN: >60
GFR NON-AFRICAN AMERICAN: >60
GLOBULIN: 2.7 G/DL (ref 2.3–3.5)
GLUCOSE BLD-MCNC: 87 MG/DL (ref 74–109)
HCT VFR BLD CALC: 38.5 % (ref 37–47)
HDLC SERPL-MCNC: 79 MG/DL (ref 40–59)
HEMOGLOBIN: 13.1 G/DL (ref 12–16)
LDL CHOLESTEROL CALCULATED: 100 MG/DL (ref 0–129)
LYMPHOCYTES ABSOLUTE: 1 K/UL (ref 1–4.8)
LYMPHOCYTES RELATIVE PERCENT: 35.6 %
MCH RBC QN AUTO: 32.1 PG (ref 27–31.3)
MCHC RBC AUTO-ENTMCNC: 34.1 % (ref 33–37)
MCV RBC AUTO: 94 FL (ref 82–100)
MONOCYTES ABSOLUTE: 0.2 K/UL (ref 0.2–0.8)
MONOCYTES RELATIVE PERCENT: 8.1 %
NEUTROPHILS ABSOLUTE: 1.4 K/UL (ref 1.4–6.5)
NEUTROPHILS RELATIVE PERCENT: 50.1 %
PDW BLD-RTO: 13.8 % (ref 11.5–14.5)
PLATELET # BLD: 252 K/UL (ref 130–400)
POTASSIUM SERPL-SCNC: 4.3 MEQ/L (ref 3.5–5.1)
RBC # BLD: 4.1 M/UL (ref 4.2–5.4)
SLIDE REVIEW: ABNORMAL
SODIUM BLD-SCNC: 143 MEQ/L (ref 132–144)
T4 FREE: 1.15 NG/DL (ref 0.93–1.7)
T4 TOTAL: 7.3 UG/DL (ref 4.5–11.7)
TOTAL PROTEIN: 7.3 G/DL (ref 6.4–8.1)
TRIGL SERPL-MCNC: 46 MG/DL (ref 0–200)
TSH SERPL DL<=0.05 MIU/L-ACNC: 3.06 UIU/ML (ref 0.27–4.2)
WBC # BLD: 2.9 K/UL (ref 4.8–10.8)

## 2019-01-10 LAB — H PYLORI BREATH TEST: NEGATIVE

## 2019-02-20 ENCOUNTER — TELEPHONE (OUTPATIENT)
Dept: PRIMARY CARE CLINIC | Age: 49
End: 2019-02-20

## 2019-02-20 ENCOUNTER — TELEPHONE (OUTPATIENT)
Dept: ADMINISTRATIVE | Age: 49
End: 2019-02-20

## 2019-02-27 RX ORDER — BUSPIRONE HYDROCHLORIDE 15 MG/1
TABLET ORAL
Qty: 90 TABLET | Refills: 3 | Status: SHIPPED | OUTPATIENT
Start: 2019-02-27

## 2019-03-11 ENCOUNTER — NURSE ONLY (OUTPATIENT)
Dept: PRIMARY CARE CLINIC | Age: 49
End: 2019-03-11

## 2019-03-11 ENCOUNTER — OFFICE VISIT (OUTPATIENT)
Dept: PRIMARY CARE CLINIC | Age: 49
End: 2019-03-11
Payer: COMMERCIAL

## 2019-03-11 VITALS
DIASTOLIC BLOOD PRESSURE: 78 MMHG | TEMPERATURE: 97.4 F | BODY MASS INDEX: 25.11 KG/M2 | HEIGHT: 67 IN | OXYGEN SATURATION: 97 % | RESPIRATION RATE: 16 BRPM | WEIGHT: 160 LBS | HEART RATE: 72 BPM | SYSTOLIC BLOOD PRESSURE: 112 MMHG

## 2019-03-11 DIAGNOSIS — J30.2 SEASONAL ALLERGIC RHINITIS, UNSPECIFIED TRIGGER: Primary | ICD-10-CM

## 2019-03-11 DIAGNOSIS — G89.29 CHRONIC BILATERAL LOW BACK PAIN WITHOUT SCIATICA: ICD-10-CM

## 2019-03-11 DIAGNOSIS — J30.9 ALLERGIC RHINITIS, UNSPECIFIED SEASONALITY, UNSPECIFIED TRIGGER: Primary | ICD-10-CM

## 2019-03-11 DIAGNOSIS — M54.50 CHRONIC BILATERAL LOW BACK PAIN WITHOUT SCIATICA: ICD-10-CM

## 2019-03-11 PROCEDURE — 99213 OFFICE O/P EST LOW 20 MIN: CPT | Performed by: FAMILY MEDICINE

## 2019-03-11 PROCEDURE — 96372 THER/PROPH/DIAG INJ SC/IM: CPT | Performed by: FAMILY MEDICINE

## 2019-03-11 RX ORDER — TRIAMCINOLONE ACETONIDE 40 MG/ML
80 INJECTION, SUSPENSION INTRA-ARTICULAR; INTRAMUSCULAR ONCE
Status: COMPLETED | OUTPATIENT
Start: 2019-03-11 | End: 2019-03-11

## 2019-03-11 RX ORDER — METHOCARBAMOL 500 MG/1
500 TABLET, FILM COATED ORAL 4 TIMES DAILY
Qty: 90 TABLET | Refills: 3 | Status: SHIPPED | OUTPATIENT
Start: 2019-03-11

## 2019-03-11 RX ADMIN — TRIAMCINOLONE ACETONIDE 80 MG: 40 INJECTION, SUSPENSION INTRA-ARTICULAR; INTRAMUSCULAR at 17:08

## 2019-03-11 ASSESSMENT — ENCOUNTER SYMPTOMS
ABDOMINAL PAIN: 0
EYE DISCHARGE: 0
APNEA: 0
COLOR CHANGE: 0
PHOTOPHOBIA: 0
NAUSEA: 0
FACIAL SWELLING: 0
EYE PAIN: 0
DIARRHEA: 0
STRIDOR: 0
CHEST TIGHTNESS: 0
CONSTIPATION: 0
CHOKING: 0
EYE REDNESS: 0
WHEEZING: 0

## 2019-10-09 ENCOUNTER — TELEPHONE (OUTPATIENT)
Dept: ENDOCRINOLOGY | Age: 49
End: 2019-10-09

## 2023-02-19 PROBLEM — J01.40 ACUTE NON-RECURRENT PANSINUSITIS: Status: ACTIVE | Noted: 2023-02-19

## 2023-02-19 PROBLEM — K82.4 GALL BLADDER POLYP: Status: ACTIVE | Noted: 2023-02-19

## 2023-02-19 PROBLEM — J30.2 SEASONAL ALLERGIES: Status: ACTIVE | Noted: 2023-02-19

## 2023-02-19 PROBLEM — M25.569 KNEE PAIN: Status: ACTIVE | Noted: 2023-02-19

## 2023-02-19 PROBLEM — F32.A DEPRESSION: Status: ACTIVE | Noted: 2023-02-19

## 2023-02-19 PROBLEM — R09.81 NASAL CONGESTION WITH RHINORRHEA: Status: ACTIVE | Noted: 2023-02-19

## 2023-02-19 PROBLEM — E78.5 HYPERLIPIDEMIA: Status: ACTIVE | Noted: 2023-02-19

## 2023-02-19 PROBLEM — R73.02 IMPAIRED GLUCOSE TOLERANCE: Status: ACTIVE | Noted: 2023-02-19

## 2023-02-19 PROBLEM — J32.9 SINUSITIS: Status: ACTIVE | Noted: 2023-02-19

## 2023-02-19 PROBLEM — F41.9 ANXIETY: Status: ACTIVE | Noted: 2023-02-19

## 2023-02-19 PROBLEM — R79.89 ELEVATED LFTS: Status: ACTIVE | Noted: 2023-02-19

## 2023-02-19 PROBLEM — R10.9 ABDOMINAL PAIN: Status: ACTIVE | Noted: 2023-02-19

## 2023-02-19 PROBLEM — S81.802A WOUND OF LEFT LEG: Status: ACTIVE | Noted: 2023-02-19

## 2023-02-19 PROBLEM — J30.9 ALLERGIC RHINITIS: Status: ACTIVE | Noted: 2023-02-19

## 2023-02-19 PROBLEM — R53.83 FATIGUE: Status: ACTIVE | Noted: 2023-02-19

## 2023-02-19 PROBLEM — J00 NASOPHARYNGITIS: Status: ACTIVE | Noted: 2023-02-19

## 2023-02-19 PROBLEM — R63.5 WEIGHT GAIN: Status: ACTIVE | Noted: 2023-02-19

## 2023-02-19 PROBLEM — J34.89 NASAL CONGESTION WITH RHINORRHEA: Status: ACTIVE | Noted: 2023-02-19

## 2023-02-19 PROBLEM — D72.819 LEUKOPENIA: Status: ACTIVE | Noted: 2023-02-19

## 2023-02-19 PROBLEM — D09.9 SQUAMOUS CELL CARCINOMA IN SITU (SCCIS): Status: ACTIVE | Noted: 2023-02-19

## 2023-02-19 PROBLEM — R51.9 TEMPORAL PAIN: Status: ACTIVE | Noted: 2023-02-19

## 2023-02-19 PROBLEM — R60.9 EDEMA: Status: ACTIVE | Noted: 2023-02-19

## 2023-02-19 PROBLEM — B00.9 HERPES SIMPLEX: Status: ACTIVE | Noted: 2023-02-19

## 2023-02-19 RX ORDER — MONTELUKAST SODIUM 10 MG/1
10 TABLET ORAL NIGHTLY
COMMUNITY

## 2023-02-19 RX ORDER — TRAZODONE HYDROCHLORIDE 50 MG/1
50 TABLET ORAL NIGHTLY PRN
COMMUNITY
End: 2023-03-13 | Stop reason: ALTCHOICE

## 2023-02-19 RX ORDER — BUSPIRONE HYDROCHLORIDE 15 MG/1
1 TABLET ORAL 3 TIMES DAILY
COMMUNITY
Start: 2020-05-01 | End: 2023-04-17 | Stop reason: SDUPTHER

## 2023-02-19 RX ORDER — AZELASTINE 1 MG/ML
2 SPRAY, METERED NASAL DAILY
COMMUNITY
End: 2023-08-16 | Stop reason: SDUPTHER

## 2023-03-13 ENCOUNTER — OFFICE VISIT (OUTPATIENT)
Dept: PRIMARY CARE | Facility: CLINIC | Age: 53
End: 2023-03-13
Payer: COMMERCIAL

## 2023-03-13 VITALS
DIASTOLIC BLOOD PRESSURE: 66 MMHG | BODY MASS INDEX: 27.31 KG/M2 | HEIGHT: 67 IN | WEIGHT: 174 LBS | OXYGEN SATURATION: 97 % | HEART RATE: 70 BPM | RESPIRATION RATE: 14 BRPM | SYSTOLIC BLOOD PRESSURE: 128 MMHG

## 2023-03-13 DIAGNOSIS — R73.02 IMPAIRED GLUCOSE TOLERANCE: ICD-10-CM

## 2023-03-13 DIAGNOSIS — R53.83 OTHER FATIGUE: ICD-10-CM

## 2023-03-13 DIAGNOSIS — E78.2 MIXED HYPERLIPIDEMIA: ICD-10-CM

## 2023-03-13 DIAGNOSIS — J30.2 SEASONAL ALLERGIES: ICD-10-CM

## 2023-03-13 DIAGNOSIS — J30.9 ALLERGIC RHINITIS, UNSPECIFIED SEASONALITY, UNSPECIFIED TRIGGER: ICD-10-CM

## 2023-03-13 DIAGNOSIS — R63.5 WEIGHT GAIN: Primary | ICD-10-CM

## 2023-03-13 DIAGNOSIS — F32.A DEPRESSION, UNSPECIFIED DEPRESSION TYPE: ICD-10-CM

## 2023-03-13 PROCEDURE — 99214 OFFICE O/P EST MOD 30 MIN: CPT | Performed by: FAMILY MEDICINE

## 2023-03-13 RX ORDER — ESCITALOPRAM OXALATE 10 MG/1
TABLET ORAL
Qty: 30 TABLET | Refills: 5 | Status: SHIPPED | OUTPATIENT
Start: 2023-03-13

## 2023-03-13 RX ORDER — TRIAMCINOLONE ACETONIDE 40 MG/ML
80 INJECTION, SUSPENSION INTRA-ARTICULAR; INTRAMUSCULAR ONCE
Status: SHIPPED | OUTPATIENT
Start: 2023-03-13

## 2023-03-13 RX ORDER — PHENTERMINE HYDROCHLORIDE 37.5 MG/1
37.5 CAPSULE ORAL
Qty: 30 CAPSULE | Refills: 1 | Status: SHIPPED | OUTPATIENT
Start: 2023-03-13 | End: 2023-03-15 | Stop reason: SDUPTHER

## 2023-03-13 ASSESSMENT — ENCOUNTER SYMPTOMS
ALLERGIC/IMMUNOLOGIC NEGATIVE: 1
CONSTITUTIONAL NEGATIVE: 1
GASTROINTESTINAL NEGATIVE: 1
PSYCHIATRIC NEGATIVE: 1
HEMATOLOGIC/LYMPHATIC NEGATIVE: 1
EYES NEGATIVE: 1
NEUROLOGICAL NEGATIVE: 1
ENDOCRINE NEGATIVE: 1
CARDIOVASCULAR NEGATIVE: 1
RESPIRATORY NEGATIVE: 1
MUSCULOSKELETAL NEGATIVE: 1

## 2023-03-13 NOTE — PROGRESS NOTES
"Subjective   Patient ID: Halle Rai is a 52 y.o. female who presents for Insomnia (Pt presents after starting Trazodone she states she stopped it 5 days after starting it, she states it keeps her awake.) pt admits to increased stress levels and feeling overwhelmed. Pt states that she is constantly fatigued yet works out and eats healthy.     Review of Systems   Constitutional: Negative.    HENT: Negative.     Eyes: Negative.    Respiratory: Negative.     Cardiovascular: Negative.    Gastrointestinal: Negative.    Endocrine: Negative.    Genitourinary: Negative.    Musculoskeletal: Negative.    Skin: Negative.    Allergic/Immunologic: Negative.    Neurological: Negative.    Hematological: Negative.    Psychiatric/Behavioral: Negative.         Objective   /66 (BP Location: Left arm, Patient Position: Sitting, BP Cuff Size: Adult)   Pulse 70   Resp 14   Ht 1.702 m (5' 7\")   Wt 78.9 kg (174 lb)   SpO2 97%   BMI 27.25 kg/m²     Physical Exam CONSTITUTIONAL- NAD, Pt is A & O x3, Vital signs reviewed per chart.  General Appearance- normal , good hygiene,talks easily  EYES-PERRLA conjunctiva and lids- normal  EARS/NOSE-TM's normal, head normocephalic and atraumatic, naso pharynx-no nasal discharge, no trismus,  oropharynx- normal without exudate  NECK- supple, FROM, without mass  thyroid- NT, normal size, no nodule noted  LYMPH- WNL  CV- RRR without murmur, gallop, or other abnormality.  PULM- CTA bilaterally  Respiratory effort- normal respiratory effort , no retractions or nasal flaring  ABDOMEN- normoactive BS's , soft , NT, no hepatosplenomegaly palpated, or masses. No pulsatile masses noted  extremities no edema,NT  SKIN- no abnormal skin lesions on inspection or palpation  neuro- no focal deficits  PSYCH- pleasant, normal judgement and insight     Assessment/Plan   Problem List Items Addressed This Visit          Endocrine/Metabolic    Weight gain - Primary       Other    Depression    Fatigue    " Hyperlipidemia        Scribe Attestation  By signing my name below, I, Shelbie Leiva MA  , Scribe   attest that this documentation has been prepared under the direction and in the presence of Lukas Epstein DO.

## 2023-03-13 NOTE — PATIENT INSTRUCTIONS
-Continue current medications and therapy for chronic medical conditions.     -start lexapro 0.5 tablet daily for 4 days then increase to 1mg     -start adipex     -follow up in 4 weeks on weight gain and depression.

## 2023-03-14 DIAGNOSIS — R63.5 WEIGHT GAIN: ICD-10-CM

## 2023-03-14 DIAGNOSIS — R53.83 OTHER FATIGUE: ICD-10-CM

## 2023-03-14 DIAGNOSIS — E78.2 MIXED HYPERLIPIDEMIA: ICD-10-CM

## 2023-03-15 NOTE — TELEPHONE ENCOUNTER
Pt calling in regards to new adipex prescription  [facility] in [place] on [address] has the adipex and is a good pharmacy for this to go to

## 2023-03-16 RX ORDER — PHENTERMINE HYDROCHLORIDE 37.5 MG/1
37.5 CAPSULE ORAL
Qty: 30 CAPSULE | Refills: 1 | Status: SHIPPED | OUTPATIENT
Start: 2023-03-16 | End: 2023-04-17 | Stop reason: SDUPTHER

## 2023-03-20 ENCOUNTER — TELEPHONE (OUTPATIENT)
Dept: PRIMARY CARE | Facility: CLINIC | Age: 53
End: 2023-03-20

## 2023-03-20 NOTE — TELEPHONE ENCOUNTER
Pt states since starting the half tablet of Lexapro she is feeling better and will continue to take only a half tablet instead of increasing to

## 2023-04-17 ENCOUNTER — TELEMEDICINE (OUTPATIENT)
Dept: PRIMARY CARE | Facility: CLINIC | Age: 53
End: 2023-04-17
Payer: COMMERCIAL

## 2023-04-17 DIAGNOSIS — R63.5 WEIGHT GAIN: ICD-10-CM

## 2023-04-17 DIAGNOSIS — F32.A DEPRESSION, UNSPECIFIED DEPRESSION TYPE: Primary | ICD-10-CM

## 2023-04-17 DIAGNOSIS — R53.83 OTHER FATIGUE: ICD-10-CM

## 2023-04-17 DIAGNOSIS — E78.2 MIXED HYPERLIPIDEMIA: ICD-10-CM

## 2023-04-17 PROCEDURE — 99213 OFFICE O/P EST LOW 20 MIN: CPT | Performed by: FAMILY MEDICINE

## 2023-04-17 RX ORDER — PHENTERMINE HYDROCHLORIDE 37.5 MG/1
37.5 CAPSULE ORAL
Qty: 30 CAPSULE | Refills: 0 | Status: SHIPPED | OUTPATIENT
Start: 2023-04-17 | End: 2023-06-16

## 2023-04-17 RX ORDER — BUSPIRONE HYDROCHLORIDE 10 MG/1
20 TABLET ORAL 3 TIMES DAILY
Qty: 180 TABLET | Refills: 3 | Status: SHIPPED | OUTPATIENT
Start: 2023-04-17

## 2023-04-17 NOTE — PROGRESS NOTES
Subjective   Patient ID: Halle Rai is a 53 y.o. female who presents for weight loss    HPI Pt is currently taking Adipex and has lost 10 pounds  Also having significant problems with anxiety and depression that seemed to respond very well to the BuSpar.  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  She feels the BuSpar helps her quite a bit and she would like to look at increasing the dose.  We had discussed her prior Lexapro which seemed to cause significant sexual dysfunction so she does not want to use that anymore she is cut that down to half a pill daily.  Review of Systems  12 Systems have been reviewed as follows.  Constitutional: Fever, weight gain, weight loss, appetite change, night sweats, fatigue, chills.  Eyes : blurry, double vision, vision, loss, tearing, redness, pain, sensitivity to light, glaucoma.  Ears: nose, mouth, and throat: Hearing loss, ringing in the ears, ear pain, nasal congestion, nasal drainage, nosebleeds, mouth, throat, irritation tooth problem.  Cardiovascular: chest pain, pressure, heart racing, palpitations, sweating, leg swelling, high or low blood pressure  Pulmonary: Cough, yellow or green sputum, blood and sputum, shortness of breath, wheezing  Gastrointestinal: Nausea, vomiting, diarrhea, constipation, pain, blood in stool, or vomitus, heartburn, difficulty swallowing  Genitourinary: incontinence, abnormal bleeding, abnormal discharge, urinary frequency, urinary hesitancy, pain, impotence sexual problem, infection, urinary retention  Musculoskeletal: Pain, stiffness, joint, redness or warmth, arthritis, back pain, weakness, muscle wasting, sprain or fracture  Neuro: Weight weakness, dizziness, change in voice, change in taste change in vision, change in hearing, loss, or change of sensation, trouble walking, balance problems coordination problems, shaking,  speech problem  Endocrine: cold or heat intolerance, blood sugar problem, weight gain or loss missed periods hot flashes, sweats, change in body hair, change in libido, increased thirst, increased urination  Heme/lymph: Swelling, bleeding, problem anemia, bruising, enlarged lymph nodes  Allergic/immunologic: H. plus nasal drip, watery itchy eyes, nasal drainage, immunosuppressed  The above were reviewed and noted negative except as noted in HPI and Problem List.    Objective   There were no vitals taken for this visit.    Physical Exam  .je    Assessment/Plan   Problem List Items Addressed This Visit       Depression - Primary    Relevant Medications    busPIRone (Buspar) 10 mg tablet    Fatigue    Relevant Medications    phentermine 37.5 mg capsule    Hyperlipidemia    Relevant Medications    phentermine 37.5 mg capsule    Weight gain    Relevant Medications    phentermine 37.5 mg capsule        Scribe Attestation  By signing my name below, I, Lukas Epstein DO , Scribaureliano   attest that this documentation has been prepared under the direction and in the presence of Lukas Epstein DO.

## 2023-04-19 ENCOUNTER — TELEPHONE (OUTPATIENT)
Dept: PRIMARY CARE | Facility: CLINIC | Age: 53
End: 2023-04-19
Payer: COMMERCIAL

## 2023-04-19 NOTE — TELEPHONE ENCOUNTER
Patient of DR. Lucian ACHARYA submitted for buspirone hcl 10mg tablet    Patient insurance wont cover for the 2 in morning 2 afternoon 2  at bed    Patient insurance will only cover 3 tablets per day

## 2023-05-30 DIAGNOSIS — B00.9 HERPES SIMPLEX: ICD-10-CM

## 2023-05-30 RX ORDER — VALACYCLOVIR HYDROCHLORIDE 1 G/1
1000 TABLET, FILM COATED ORAL 2 TIMES DAILY
COMMUNITY
Start: 2021-04-19 | End: 2023-05-30 | Stop reason: SDUPTHER

## 2023-05-30 NOTE — TELEPHONE ENCOUNTER
Dr. Epstein patient  Refill for Valtrex 1gm - take 1 tablet by mouth two times a day  [id number] - [place], [place] - [address]

## 2023-05-31 RX ORDER — VALACYCLOVIR HYDROCHLORIDE 1 G/1
1000 TABLET, FILM COATED ORAL 2 TIMES DAILY
Qty: 60 TABLET | Refills: 2 | Status: SHIPPED | OUTPATIENT
Start: 2023-05-31

## 2023-06-12 ENCOUNTER — OFFICE VISIT (OUTPATIENT)
Dept: PRIMARY CARE | Facility: CLINIC | Age: 53
End: 2023-06-12
Payer: COMMERCIAL

## 2023-06-12 VITALS
HEART RATE: 74 BPM | HEIGHT: 67 IN | OXYGEN SATURATION: 98 % | BODY MASS INDEX: 24.33 KG/M2 | DIASTOLIC BLOOD PRESSURE: 74 MMHG | SYSTOLIC BLOOD PRESSURE: 126 MMHG | WEIGHT: 155 LBS | RESPIRATION RATE: 14 BRPM

## 2023-06-12 DIAGNOSIS — N81.10 PROLAPSE OF FEMALE BLADDER, ACQUIRED: Primary | ICD-10-CM

## 2023-06-12 DIAGNOSIS — Z12.31 ENCOUNTER FOR SCREENING MAMMOGRAM FOR MALIGNANT NEOPLASM OF BREAST: ICD-10-CM

## 2023-06-12 DIAGNOSIS — K21.9 GASTROESOPHAGEAL REFLUX DISEASE, UNSPECIFIED WHETHER ESOPHAGITIS PRESENT: ICD-10-CM

## 2023-06-12 PROBLEM — R60.9 EDEMA: Status: RESOLVED | Noted: 2023-02-19 | Resolved: 2023-06-12

## 2023-06-12 PROBLEM — K20.90 ESOPHAGITIS: Status: ACTIVE | Noted: 2023-06-12

## 2023-06-12 PROBLEM — S81.802A WOUND OF LEFT LEG: Status: RESOLVED | Noted: 2023-02-19 | Resolved: 2023-06-12

## 2023-06-12 PROBLEM — E78.5 HYPERLIPIDEMIA: Status: RESOLVED | Noted: 2023-02-19 | Resolved: 2023-06-12

## 2023-06-12 PROBLEM — J34.89 NASAL CONGESTION WITH RHINORRHEA: Status: RESOLVED | Noted: 2023-02-19 | Resolved: 2023-06-12

## 2023-06-12 PROBLEM — J30.2 SEASONAL ALLERGIES: Status: RESOLVED | Noted: 2023-02-19 | Resolved: 2023-06-12

## 2023-06-12 PROBLEM — K82.4 GALL BLADDER POLYP: Status: RESOLVED | Noted: 2023-02-19 | Resolved: 2023-06-12

## 2023-06-12 PROBLEM — J30.9 ALLERGIC RHINITIS: Status: RESOLVED | Noted: 2023-02-19 | Resolved: 2023-06-12

## 2023-06-12 PROBLEM — R09.81 NASAL CONGESTION WITH RHINORRHEA: Status: RESOLVED | Noted: 2023-02-19 | Resolved: 2023-06-12

## 2023-06-12 PROBLEM — R53.83 FATIGUE: Status: RESOLVED | Noted: 2023-02-19 | Resolved: 2023-06-12

## 2023-06-12 PROBLEM — M25.569 KNEE PAIN: Status: RESOLVED | Noted: 2023-02-19 | Resolved: 2023-06-12

## 2023-06-12 PROBLEM — R10.9 ABDOMINAL PAIN: Status: RESOLVED | Noted: 2023-02-19 | Resolved: 2023-06-12

## 2023-06-12 PROBLEM — R51.9 TEMPORAL PAIN: Status: RESOLVED | Noted: 2023-02-19 | Resolved: 2023-06-12

## 2023-06-12 PROBLEM — J00 NASOPHARYNGITIS: Status: RESOLVED | Noted: 2023-02-19 | Resolved: 2023-06-12

## 2023-06-12 PROBLEM — J32.9 SINUSITIS: Status: RESOLVED | Noted: 2023-02-19 | Resolved: 2023-06-12

## 2023-06-12 PROBLEM — J01.40 ACUTE NON-RECURRENT PANSINUSITIS: Status: RESOLVED | Noted: 2023-02-19 | Resolved: 2023-06-12

## 2023-06-12 PROCEDURE — 99214 OFFICE O/P EST MOD 30 MIN: CPT | Performed by: FAMILY MEDICINE

## 2023-06-12 RX ORDER — MIRABEGRON 50 MG/1
50 TABLET, EXTENDED RELEASE ORAL DAILY
COMMUNITY

## 2023-06-12 RX ORDER — PANTOPRAZOLE SODIUM 40 MG/1
40 TABLET, DELAYED RELEASE ORAL
Qty: 30 TABLET | Refills: 3 | Status: SHIPPED | OUTPATIENT
Start: 2023-06-12 | End: 2023-12-01 | Stop reason: SDUPTHER

## 2023-06-12 ASSESSMENT — ENCOUNTER SYMPTOMS
CARDIOVASCULAR NEGATIVE: 1
MUSCULOSKELETAL NEGATIVE: 1
ALLERGIC/IMMUNOLOGIC NEGATIVE: 1
PSYCHIATRIC NEGATIVE: 1
NEUROLOGICAL NEGATIVE: 1
CONSTITUTIONAL NEGATIVE: 1
GASTROINTESTINAL NEGATIVE: 1
ENDOCRINE NEGATIVE: 1
HEMATOLOGIC/LYMPHATIC NEGATIVE: 1
RESPIRATORY NEGATIVE: 1
EYES NEGATIVE: 1

## 2023-06-12 NOTE — PROGRESS NOTES
"Subjective   Patient ID: Halle Rai is a 53 y.o. female who presents for Vaginal Prolapse.    HPI Pt went to [facility]  in May 2023 for what she thought was a UTI, the NP states she wanted her to follow up with her PCP for a possible prolapsed. Pt states she still feels pressure and states she feels like she doesn't empty all the way.    Review of Systems   Constitutional: Negative.    HENT: Negative.     Eyes: Negative.    Respiratory: Negative.     Cardiovascular: Negative.    Gastrointestinal: Negative.    Endocrine: Negative.    Genitourinary: Negative.    Musculoskeletal: Negative.    Skin: Negative.    Allergic/Immunologic: Negative.    Neurological: Negative.    Hematological: Negative.    Psychiatric/Behavioral: Negative.         Objective   /74 (BP Location: Left arm, Patient Position: Sitting, BP Cuff Size: Adult)   Pulse 74   Resp 14   Ht 1.702 m (5' 7\")   Wt 70.3 kg (155 lb)   SpO2 98%   BMI 24.28 kg/m²     Physical Exam  Constitutional: Well developed, well nourished, alert and in no acute distress   Eyes: Normal external exam. Pupils equally round and reactive to light with normal accommodation and extraocular movements intact.  Neck: Supple, no lymphadenopathy or masses.   Cardiovascular: Regular rate and rhythm, normal S1 and S2, no murmurs, gallops, or rubs. Radial pulses normal. No peripheral edema.  Pulmonary: No respiratory distress, lungs clear to auscultation bilaterally. No wheezes, rhonchi, rales.  Abdomen: soft,non tender, non distended, without masses or HSM  Skin: Warm, well perfused, normal skin turgor and color.   Neurologic: Cranial nerves II-XII grossly intact.   Psychiatric: Mood calm and affect normal  Musculoskeletal: Moving all extremities without restriction      Assessment/Plan   Problem List Items Addressed This Visit       Prolapse of female bladder, acquired - Primary    Gastroesophageal reflux disease    Relevant Medications    pantoprazole (ProtoNix) 40 " mg EC tablet    Encounter for screening mammogram for malignant neoplasm of breast    Relevant Orders    BI mammo bilateral screening tomosynthesis      Scribe Attestation  By signing my name below, I, Marcella NELSON , Scribe   attest that this documentation has been prepared under the direction and in the presence of Lukas Epstein DO.  Provider Attestation - Scribe documentation  All medical record entries made by the Scribe were at my direction and personally dictated by me. I have reviewed the chart and agree that the record accurately reflects my personal performance of the history, physical exam, discussion and plan.

## 2023-06-12 NOTE — PATIENT INSTRUCTIONS
Continue current medications and therapy for chronic medical conditions    Screening mammogram ordered     Patient will schedule appointment with her Gynecologist     Start Myrbetriq 50mg once daily     Start Protonix 40mg daily

## 2023-06-16 DIAGNOSIS — R53.83 OTHER FATIGUE: ICD-10-CM

## 2023-06-16 DIAGNOSIS — E78.2 MIXED HYPERLIPIDEMIA: ICD-10-CM

## 2023-06-16 DIAGNOSIS — R63.5 WEIGHT GAIN: ICD-10-CM

## 2023-06-16 RX ORDER — PHENTERMINE HYDROCHLORIDE 37.5 MG/1
CAPSULE ORAL
Qty: 30 CAPSULE | Refills: 0 | Status: SHIPPED | OUTPATIENT
Start: 2023-06-16 | End: 2023-07-18

## 2023-07-18 DIAGNOSIS — R63.5 WEIGHT GAIN: ICD-10-CM

## 2023-07-18 DIAGNOSIS — R53.83 OTHER FATIGUE: ICD-10-CM

## 2023-07-18 DIAGNOSIS — E78.2 MIXED HYPERLIPIDEMIA: ICD-10-CM

## 2023-07-18 RX ORDER — PHENTERMINE HYDROCHLORIDE 37.5 MG/1
CAPSULE ORAL
Qty: 30 CAPSULE | Refills: 0 | Status: SHIPPED | OUTPATIENT
Start: 2023-07-18 | End: 2023-07-19 | Stop reason: SDUPTHER

## 2023-07-18 NOTE — TELEPHONE ENCOUNTER
Pt requesting refill on adipex     Pharmacy: [facility] - [place], [place] - [address]  [address] [place] [address] [place] [address]  Phone: [phone]  Fax: [phone]

## 2023-07-20 RX ORDER — PHENTERMINE HYDROCHLORIDE 37.5 MG/1
CAPSULE ORAL
Qty: 30 CAPSULE | Refills: 0 | Status: SHIPPED | OUTPATIENT
Start: 2023-07-20 | End: 2023-08-16 | Stop reason: SDUPTHER

## 2023-07-25 ENCOUNTER — OFFICE VISIT (OUTPATIENT)
Dept: PRIMARY CARE | Facility: CLINIC | Age: 53
End: 2023-07-25
Payer: COMMERCIAL

## 2023-07-25 ENCOUNTER — TELEPHONE (OUTPATIENT)
Dept: PRIMARY CARE | Facility: CLINIC | Age: 53
End: 2023-07-25

## 2023-07-25 ENCOUNTER — LAB (OUTPATIENT)
Dept: LAB | Facility: LAB | Age: 53
End: 2023-07-25
Payer: COMMERCIAL

## 2023-07-25 VITALS
OXYGEN SATURATION: 98 % | WEIGHT: 150 LBS | DIASTOLIC BLOOD PRESSURE: 80 MMHG | HEART RATE: 80 BPM | SYSTOLIC BLOOD PRESSURE: 118 MMHG | HEIGHT: 67 IN | BODY MASS INDEX: 23.54 KG/M2 | RESPIRATION RATE: 16 BRPM

## 2023-07-25 DIAGNOSIS — E01.0 THYROMEGALY: ICD-10-CM

## 2023-07-25 LAB
THYROTROPIN (MIU/L) IN SER/PLAS BY DETECTION LIMIT <= 0.05 MIU/L: 2.44 MIU/L (ref 0.44–3.98)
THYROXINE (T4) (UG/DL) IN SER/PLAS: 10.9 UG/DL (ref 4.5–11.1)
THYROXINE (T4) FREE (NG/DL) IN SER/PLAS: 1.01 NG/DL (ref 0.61–1.12)

## 2023-07-25 PROCEDURE — 84439 ASSAY OF FREE THYROXINE: CPT

## 2023-07-25 PROCEDURE — 99213 OFFICE O/P EST LOW 20 MIN: CPT | Performed by: FAMILY MEDICINE

## 2023-07-25 PROCEDURE — 84443 ASSAY THYROID STIM HORMONE: CPT

## 2023-07-25 PROCEDURE — 36415 COLL VENOUS BLD VENIPUNCTURE: CPT

## 2023-07-25 ASSESSMENT — ENCOUNTER SYMPTOMS
DIZZINESS: 0
SINUS PRESSURE: 0
PALPITATIONS: 0
SLEEP DISTURBANCE: 0
BLOOD IN STOOL: 0
FEVER: 0
CHEST TIGHTNESS: 0
HEMATURIA: 0
AGITATION: 0
SEIZURES: 0
MYALGIAS: 0
NERVOUS/ANXIOUS: 0
DIARRHEA: 0
STRIDOR: 0
COLOR CHANGE: 0
RECTAL PAIN: 0
SHORTNESS OF BREATH: 0
ABDOMINAL PAIN: 0
FLANK PAIN: 0
RHINORRHEA: 0
ABDOMINAL DISTENTION: 0
ADENOPATHY: 0
PHOTOPHOBIA: 0
APPETITE CHANGE: 0
ARTHRALGIAS: 0
POLYPHAGIA: 0
SPEECH DIFFICULTY: 0
CONSTITUTIONAL NEGATIVE: 1
HEADACHES: 0
ACTIVITY CHANGE: 0
DYSPHORIC MOOD: 0
CONSTIPATION: 0
DECREASED CONCENTRATION: 0
DYSURIA: 0
COUGH: 0
FATIGUE: 0
EYE PAIN: 0
TROUBLE SWALLOWING: 0
NECK STIFFNESS: 0
SINUS PAIN: 0
CONFUSION: 0
POLYDIPSIA: 0
SORE THROAT: 0

## 2023-07-25 NOTE — PROGRESS NOTES
"Subjective   Patient ID: Halle Rai is a 53 y.o. female who presents for Neck Pain.    HPI Pt c/o neck pain on the left front side of neck. Pt states it started 3 weeks where it feels like her throat is full. Pt states that she has gotten sore throat, tonsil stones, makes her breath smell bad.     Review of Systems   Constitutional: Negative.  Negative for activity change, appetite change, fatigue and fever.   HENT:  Negative for congestion, dental problem, ear discharge, ear pain, mouth sores, rhinorrhea, sinus pressure, sinus pain, sore throat, tinnitus and trouble swallowing.    Eyes:  Negative for photophobia, pain and visual disturbance.   Respiratory:  Negative for cough, chest tightness, shortness of breath and stridor.    Cardiovascular:  Negative for chest pain and palpitations.   Gastrointestinal:  Negative for abdominal distention, abdominal pain, blood in stool, constipation, diarrhea and rectal pain.   Endocrine: Negative for cold intolerance, heat intolerance, polydipsia, polyphagia and polyuria.   Genitourinary:  Negative for dysuria, flank pain, hematuria and urgency.   Musculoskeletal:  Negative for arthralgias, gait problem, myalgias and neck stiffness.   Skin:  Negative for color change and rash.   Allergic/Immunologic: Negative for environmental allergies and food allergies.   Neurological:  Negative for dizziness, seizures, syncope, speech difficulty and headaches.   Hematological:  Negative for adenopathy.   Psychiatric/Behavioral:  Negative for agitation, confusion, decreased concentration, dysphoric mood and sleep disturbance. The patient is not nervous/anxious.        Objective   Resp 16   Ht 1.702 m (5' 7\")   Wt 68 kg (150 lb)   BMI 23.49 kg/m²     Physical Exam CONSTITUTIONAL- NAD, Pt is A & O x3, Vital signs reviewed per chart.  General Appearance- normal , good hygiene,talks easily  EYES-PERRLA conjunctiva and lids- normal  EARS/NOSE-TM's normal, head normocephalic and atraumatic, " naso pharynx-no nasal discharge, no trismus,  oropharynx- normal without exudate  NECK- supple, FROM, without mass  thyroid- NT, normal size, no nodule noted  LYMPH- WNL  CV- RRR without murmur, gallop, or other abnormality.  PULM- CTA bilaterally  Respiratory effort- normal respiratory effort , no retractions or nasal flaring  ABDOMEN- normoactive BS's , soft , NT, no hepatosplenomegaly palpated, or masses. No pulsatile masses noted  extremities no edema,NT  SKIN- no abnormal skin lesions on inspection or palpation  neuro- no focal deficits  PSYCH- pleasant, normal judgement and insight     Assessment/Plan   Problem List Items Addressed This Visit    None  Visit Diagnoses       Thyromegaly        Relevant Orders    US head neck soft tissue    Thyroid Stimulating Hormone    Thyroxine, Free    Thyroxine, Total             Scribe Attestation  By signing my name below, I, Shelbie Leiva MA  , Scribe   attest that this documentation has been prepared under the direction and in the presence of Lukas Epstein DO.

## 2023-08-16 ENCOUNTER — TELEMEDICINE (OUTPATIENT)
Dept: PRIMARY CARE | Facility: CLINIC | Age: 53
End: 2023-08-16
Payer: COMMERCIAL

## 2023-08-16 DIAGNOSIS — J30.9 ALLERGIC RHINITIS, UNSPECIFIED SEASONALITY, UNSPECIFIED TRIGGER: ICD-10-CM

## 2023-08-16 DIAGNOSIS — Z12.31 ENCOUNTER FOR SCREENING MAMMOGRAM FOR MALIGNANT NEOPLASM OF BREAST: ICD-10-CM

## 2023-08-16 DIAGNOSIS — E78.2 MIXED HYPERLIPIDEMIA: ICD-10-CM

## 2023-08-16 DIAGNOSIS — E01.0 THYROMEGALY: ICD-10-CM

## 2023-08-16 DIAGNOSIS — R63.5 WEIGHT GAIN: ICD-10-CM

## 2023-08-16 DIAGNOSIS — R53.83 OTHER FATIGUE: ICD-10-CM

## 2023-08-16 PROCEDURE — 99213 OFFICE O/P EST LOW 20 MIN: CPT | Performed by: FAMILY MEDICINE

## 2023-08-16 RX ORDER — PHENTERMINE HYDROCHLORIDE 37.5 MG/1
CAPSULE ORAL
Qty: 30 CAPSULE | Refills: 0 | Status: SHIPPED | OUTPATIENT
Start: 2023-08-16 | End: 2023-10-02

## 2023-08-16 RX ORDER — AZELASTINE 1 MG/ML
2 SPRAY, METERED NASAL 2 TIMES DAILY
Qty: 30 ML | Refills: 2 | Status: SHIPPED | OUTPATIENT
Start: 2023-08-16

## 2023-08-16 NOTE — PROGRESS NOTES
Subjective Virtual Visit - Audio and Visual Communication Real Time    Patient ID: Halle Rai is a 53 y.o. female who presents for a follow up on thyromegaly    HPI pt had repeat thyroid function tests done due to being on the higher side of normal. Pt was unsure if adipex was throwing off her thyroid. She has stopped it for now and would like to discuss.     Review of Systems 12 Systems have been reviewed as follows.  Constitutional: Fever, weight gain, weight loss, appetite change, night sweats, fatigue, chills.  Eyes : blurry, double vision, vision, loss, tearing, redness, pain, sensitivity to light, glaucoma.  Ears: nose, mouth, and throat: Hearing loss, ringing in the ears, ear pain, nasal congestion, nasal drainage, nosebleeds, mouth, throat, irritation tooth problem.  Cardiovascular: chest pain, pressure, heart racing, palpitations, sweating, leg swelling, high or low blood pressure  Pulmonary: Cough, yellow or green sputum, blood and sputum, shortness of breath, wheezing  Gastrointestinal: Nausea, vomiting, diarrhea, constipation, pain, blood in stool, or vomitus, heartburn, difficulty swallowing  Genitourinary: incontinence, abnormal bleeding, abnormal discharge, urinary frequency, urinary hesitancy, pain, impotence sexual problem, infection, urinary retention  Musculoskeletal: Pain, stiffness, joint, redness or warmth, arthritis, back pain, weakness, muscle wasting, sprain or fracture  Neuro: Weight weakness, dizziness, change in voice, change in taste change in vision, change in hearing, loss, or change of sensation, trouble walking, balance problems coordination problems, shaking, speech problem  Endocrine: cold or heat intolerance, blood sugar problem, weight gain or loss missed periods hot flashes, sweats, change in body hair, change in libido, increased thirst, increased urination  Heme/lymph: Swelling, bleeding, problem anemia, bruising, enlarged lymph nodes  Allergic/immunologic: H. plus nasal  drip, watery itchy eyes, nasal drainage, immunosuppressed  The above were reviewed and noted negative except as noted in HPI and Problem List.      Objective   There were no vitals taken for this visit.    Physical Exam GEN: pleasant, alert, NAD, talkative and easy to discuss symptoms with.  No Telephonic distress     Assessment/Plan   Problem List Items Addressed This Visit       Weight gain    Relevant Medications    phentermine 37.5 mg capsule    Encounter for screening mammogram for malignant neoplasm of breast     Other Visit Diagnoses       Thyromegaly        Mixed hyperlipidemia        Relevant Medications    phentermine 37.5 mg capsule    Other fatigue        Relevant Medications    phentermine 37.5 mg capsule    Allergic rhinitis, unspecified seasonality, unspecified trigger        Relevant Medications    azelastine (Astelin) 137 mcg (0.1 %) nasal spray             Scribe Attestation  By signing my name below, I, Lukas Epstein DO  , Scribe   attest that this documentation has been prepared under the direction and in the presence of Lukas Epstein DO.

## 2023-08-21 ENCOUNTER — APPOINTMENT (OUTPATIENT)
Dept: PRIMARY CARE | Facility: CLINIC | Age: 53
End: 2023-08-21
Payer: COMMERCIAL

## 2023-09-30 DIAGNOSIS — E78.2 MIXED HYPERLIPIDEMIA: ICD-10-CM

## 2023-09-30 DIAGNOSIS — R53.83 OTHER FATIGUE: ICD-10-CM

## 2023-09-30 DIAGNOSIS — R63.5 WEIGHT GAIN: ICD-10-CM

## 2023-10-02 RX ORDER — PHENTERMINE HYDROCHLORIDE 37.5 MG/1
CAPSULE ORAL
Qty: 30 CAPSULE | Refills: 0 | Status: SHIPPED | OUTPATIENT
Start: 2023-10-02

## 2023-11-27 ENCOUNTER — TELEPHONE (OUTPATIENT)
Dept: PRIMARY CARE | Facility: CLINIC | Age: 53
End: 2023-11-27
Payer: COMMERCIAL

## 2023-11-27 DIAGNOSIS — Z12.31 ENCOUNTER FOR SCREENING MAMMOGRAM FOR MALIGNANT NEOPLASM OF BREAST: ICD-10-CM

## 2023-11-27 NOTE — TELEPHONE ENCOUNTER
Dr. Epstein Pt      Pt calling because usually has mammogram done through [facility]- cannot get her in before end of the year. Would like JE to place referral for mammogram to be done through  to be able to have it done by the end of the year.    Pt would like notified when referral is placed    [facility] [phone]

## 2023-12-01 DIAGNOSIS — K21.9 GASTROESOPHAGEAL REFLUX DISEASE, UNSPECIFIED WHETHER ESOPHAGITIS PRESENT: ICD-10-CM

## 2023-12-01 RX ORDER — PANTOPRAZOLE SODIUM 40 MG/1
40 TABLET, DELAYED RELEASE ORAL
Qty: 30 TABLET | Refills: 3 | Status: SHIPPED | OUTPATIENT
Start: 2023-12-01 | End: 2024-04-26 | Stop reason: SDUPTHER

## 2023-12-01 NOTE — TELEPHONE ENCOUNTER
Dr Epstein pt    Pt phoned office and is requesting refill on     Pantoprazole    PARVIZ [address] dr   · BMI noted   · Therapeutic lifestyle modification discussed

## 2023-12-14 ENCOUNTER — APPOINTMENT (OUTPATIENT)
Dept: RADIOLOGY | Facility: CLINIC | Age: 53
End: 2023-12-14
Payer: COMMERCIAL

## 2023-12-22 ENCOUNTER — APPOINTMENT (OUTPATIENT)
Dept: RADIOLOGY | Facility: CLINIC | Age: 53
End: 2023-12-22
Payer: COMMERCIAL

## 2024-01-18 ENCOUNTER — HOSPITAL ENCOUNTER (OUTPATIENT)
Dept: RADIOLOGY | Facility: EXTERNAL LOCATION | Age: 54
Discharge: HOME | End: 2024-01-18

## 2024-01-18 ENCOUNTER — HOSPITAL ENCOUNTER (OUTPATIENT)
Dept: RADIOLOGY | Facility: CLINIC | Age: 54
Discharge: HOME | End: 2024-01-18
Payer: COMMERCIAL

## 2024-01-18 DIAGNOSIS — Z12.31 ENCOUNTER FOR SCREENING MAMMOGRAM FOR MALIGNANT NEOPLASM OF BREAST: ICD-10-CM

## 2024-01-18 PROCEDURE — 77063 BREAST TOMOSYNTHESIS BI: CPT | Mod: BILATERAL PROCEDURE | Performed by: RADIOLOGY

## 2024-01-18 PROCEDURE — 77067 SCR MAMMO BI INCL CAD: CPT

## 2024-01-18 PROCEDURE — 77067 SCR MAMMO BI INCL CAD: CPT | Mod: BILATERAL PROCEDURE | Performed by: RADIOLOGY

## 2024-01-22 ENCOUNTER — TELEPHONE (OUTPATIENT)
Dept: PRIMARY CARE | Facility: CLINIC | Age: 54
End: 2024-01-22
Payer: COMMERCIAL

## 2024-01-22 NOTE — TELEPHONE ENCOUNTER
Dr. Epstein Pt    Would like to speak with Neal RUSS about results from her mammogram she had done last week. Says she has an abnormal result and is concerned- wants to know what the next step is. Please advise, thank you.      Halle Rai  [phone]

## 2024-01-23 DIAGNOSIS — R92.8 ABNORMAL MAMMOGRAM: ICD-10-CM

## 2024-01-23 DIAGNOSIS — Z12.31 ENCOUNTER FOR SCREENING MAMMOGRAM FOR MALIGNANT NEOPLASM OF BREAST: ICD-10-CM

## 2024-01-24 DIAGNOSIS — Z12.39 ENCOUNTER FOR SCREENING FOR MALIGNANT NEOPLASM OF BREAST, UNSPECIFIED SCREENING MODALITY: Primary | ICD-10-CM

## 2024-01-24 NOTE — TELEPHONE ENCOUNTER
----- Message from Lukas Epstein DO sent at 1/23/2024  1:59 PM EST -----  Needs additional films.  Please call pt and order Dx Mammogram.  Thanks

## 2024-01-30 ENCOUNTER — HOSPITAL ENCOUNTER (OUTPATIENT)
Dept: RADIOLOGY | Facility: HOSPITAL | Age: 54
Discharge: HOME | End: 2024-01-30
Payer: COMMERCIAL

## 2024-01-30 DIAGNOSIS — Z12.39 ENCOUNTER FOR OTHER SCREENING FOR MALIGNANT NEOPLASM OF BREAST: ICD-10-CM

## 2024-01-30 DIAGNOSIS — Z12.39 ENCOUNTER FOR SCREENING FOR MALIGNANT NEOPLASM OF BREAST, UNSPECIFIED SCREENING MODALITY: ICD-10-CM

## 2024-01-30 PROCEDURE — 77061 BREAST TOMOSYNTHESIS UNI: CPT | Mod: RT

## 2024-01-30 PROCEDURE — 77065 DX MAMMO INCL CAD UNI: CPT | Mod: RIGHT SIDE | Performed by: RADIOLOGY

## 2024-01-30 PROCEDURE — 76642 ULTRASOUND BREAST LIMITED: CPT | Mod: RT

## 2024-01-30 PROCEDURE — 77061 BREAST TOMOSYNTHESIS UNI: CPT | Mod: RIGHT SIDE | Performed by: RADIOLOGY

## 2024-01-30 PROCEDURE — 76642 ULTRASOUND BREAST LIMITED: CPT | Mod: RIGHT SIDE | Performed by: RADIOLOGY

## 2024-01-31 ENCOUNTER — OFFICE VISIT (OUTPATIENT)
Dept: SURGERY | Facility: HOSPITAL | Age: 54
End: 2024-01-31
Payer: COMMERCIAL

## 2024-01-31 VITALS
SYSTOLIC BLOOD PRESSURE: 135 MMHG | DIASTOLIC BLOOD PRESSURE: 92 MMHG | HEART RATE: 73 BPM | WEIGHT: 155 LBS | BODY MASS INDEX: 24.33 KG/M2 | HEIGHT: 67 IN

## 2024-01-31 DIAGNOSIS — R92.8 ABNORMAL MAMMOGRAM: Primary | ICD-10-CM

## 2024-01-31 PROCEDURE — 1036F TOBACCO NON-USER: CPT | Performed by: SURGERY

## 2024-01-31 PROCEDURE — 99214 OFFICE O/P EST MOD 30 MIN: CPT | Performed by: SURGERY

## 2024-01-31 PROCEDURE — 99204 OFFICE O/P NEW MOD 45 MIN: CPT | Performed by: SURGERY

## 2024-01-31 ASSESSMENT — ENCOUNTER SYMPTOMS
RESPIRATORY NEGATIVE: 1
EYES NEGATIVE: 1
CONSTITUTIONAL NEGATIVE: 1
GASTROINTESTINAL NEGATIVE: 1
ALLERGIC/IMMUNOLOGIC NEGATIVE: 1
MUSCULOSKELETAL NEGATIVE: 1
ENDOCRINE NEGATIVE: 1
NEUROLOGICAL NEGATIVE: 1
HEMATOLOGIC/LYMPHATIC NEGATIVE: 1
PSYCHIATRIC NEGATIVE: 1
CARDIOVASCULAR NEGATIVE: 1

## 2024-01-31 NOTE — PROGRESS NOTES
Subjective   Patient ID: Halle Rai is a 53 y.o. female who presents for No chief complaint on file..  HPI  24: Abnormal diagnostic RIGHT mammogram with US following abnormal screening with microcalcifications noted.  Central posterior microcalcs, US of axilla with 1 of 6 indeterminate lymph node with mild cortical thickening, cat 4.     Previous screening mammogram from 2022 unremarkable.  In the month preceding this most recent mammogram, appreciated mild tenderness in the upper breast associated with subtle mass.  Marked tenderness lateral breast without mass.  Never with overlying skin change.  Sensation of itching across the upper breast transiently, now resolved.  No change in size since first appreciated.    Breast history:  Prior breast biopsy  FH: breast cancer in maternal aunt and GM (age 40s, no genetics performed), paternal GM (older age)  , first pregnancy age 21.  No breast-feeding  Menarche age 10  Uterine ablation age 35, amenorrheic since  No hormone exposure      Review of Systems   Constitutional: Negative.    HENT: Negative.     Eyes: Negative.    Respiratory: Negative.     Cardiovascular: Negative.    Gastrointestinal: Negative.    Endocrine: Negative.    Genitourinary: Negative.    Musculoskeletal: Negative.    Skin: Negative.    Allergic/Immunologic: Negative.    Neurological: Negative.    Hematological: Negative.    Psychiatric/Behavioral: Negative.         No past medical history on file.  Past Surgical History:   Procedure Laterality Date    BREAST BIOPSY       Family History   Problem Relation Name Age of Onset    Heart failure Maternal Grandmother      Breast cancer Maternal Grandmother      Breast cancer Paternal Grandmother      Breast cancer Mother's Sister        Social History     Socioeconomic History    Marital status:      Spouse name: Not on file    Number of children: Not on file    Years of education: Not on file    Highest education level: Not on  file   Occupational History    Not on file   Tobacco Use    Smoking status: Never    Smokeless tobacco: Never   Substance and Sexual Activity    Alcohol use: Yes     Comment: occ    Drug use: Never    Sexual activity: Not on file   Other Topics Concern    Not on file   Social History Narrative    Not on file     Social Determinants of Health     Financial Resource Strain: Not on file   Food Insecurity: Not on file   Transportation Needs: Not on file   Physical Activity: Not on file   Stress: Not on file   Social Connections: Not on file   Intimate Partner Violence: Not on file   Housing Stability: Not on file          Current Outpatient Medications:     azelastine (Astelin) 137 mcg (0.1 %) nasal spray, Administer 2 sprays into each nostril 2 times a day. Use in each nostril as directed, Disp: 30 mL, Rfl: 2    busPIRone (Buspar) 10 mg tablet, Take 2 tablets (20 mg) by mouth in the morning and 2 tablets (20 mg) in the evening and 2 tablets (20 mg) before bedtime., Disp: 180 tablet, Rfl: 3    escitalopram (Lexapro) 10 mg tablet, Take 0.5 tablet once daily for 4 days then increase to 1 full tablet daily (Patient not taking: Reported on 7/25/2023), Disp: 30 tablet, Rfl: 5    mirabegron (Myrbetriq) 50 mg tablet extended release 24 hr 24 hr tablet, Take 1 tablet (50 mg) by mouth once daily., Disp: , Rfl:     montelukast (Singulair) 10 mg tablet, Take 1 tablet (10 mg) by mouth once daily at bedtime., Disp: , Rfl:     pantoprazole (ProtoNix) 40 mg EC tablet, Take 1 tablet (40 mg) by mouth once daily in the morning. Take before meals. Do not crush, chew, or split., Disp: 30 tablet, Rfl: 3    phentermine 37.5 mg capsule, TAKE 1 CAPSULE BY MOUTH IN THE MORNING BEFORE A MEAL, Disp: 30 capsule, Rfl: 0    valACYclovir (Valtrex) 1 gram tablet, Take 1 tablet (1,000 mg) by mouth 2 times a day., Disp: 60 tablet, Rfl: 2    Current Facility-Administered Medications:     triamcinolone acetonide (Kenalog-40) injection 80 mg, 80 mg,  intramuscular, Once, Lukas Epstein, DO     Objective   There were no vitals filed for this visit.   Physical Exam  Constitutional:       General: She is not in acute distress.     Appearance: Normal appearance.   HENT:      Head: Normocephalic and atraumatic.      Mouth/Throat:      Mouth: Mucous membranes are moist.   Eyes:      Pupils: Pupils are equal, round, and reactive to light.   Cardiovascular:      Rate and Rhythm: Normal rate and regular rhythm.   Pulmonary:      Effort: Pulmonary effort is normal.   Chest:      Chest wall: No mass, swelling or tenderness.   Breasts:     Right: Mass present. No tenderness.      Left: No mass or tenderness.          Comments: Right breast with subtle firm mobile mass in the 12 o'clock position, 3 to 4 cm from the edge of the areola.  2 x 2 cm, indistinct borders.  No overlying skin change.  No nipple skin change, retraction or discharge.  Lateral right breast focally markedly tender to palpation along the pectoral border without appreciable mass in this location.  Abdominal:      General: Abdomen is flat.      Palpations: Abdomen is soft.   Musculoskeletal:         General: Normal range of motion.      Cervical back: Normal range of motion.   Lymphadenopathy:      Cervical: No cervical adenopathy.      Upper Body:      Right upper body: No axillary adenopathy.      Left upper body: No axillary adenopathy.   Skin:     General: Skin is warm and dry.   Neurological:      Mental Status: She is alert. Mental status is at baseline.   Psychiatric:         Mood and Affect: Mood normal.         Assessment/Plan   Problem List Items Addressed This Visit    None  Visit Diagnoses         Codes    Abnormal mammogram    -  Primary R92.8          Stereotactic biopsy of right breast pending +/- axillary LN biopsy after repeat ultrasound. Will return to clinic for discussion regarding pathology and further recommendations.      Reba Merino MD

## 2024-02-06 ENCOUNTER — HOSPITAL ENCOUNTER (OUTPATIENT)
Dept: RADIOLOGY | Facility: HOSPITAL | Age: 54
Discharge: HOME | End: 2024-02-06
Payer: COMMERCIAL

## 2024-02-06 DIAGNOSIS — R92.8 ABNORMAL ULTRASOUND OF BREAST: ICD-10-CM

## 2024-02-06 DIAGNOSIS — R92.8 ABNORMAL MAMMOGRAM: ICD-10-CM

## 2024-02-06 PROCEDURE — 77065 DX MAMMO INCL CAD UNI: CPT | Mod: RT

## 2024-02-06 PROCEDURE — 2720000007 HC OR 272 NO HCPCS

## 2024-02-06 PROCEDURE — 76981 USE PARENCHYMA: CPT | Mod: RT

## 2024-02-06 PROCEDURE — 76642 ULTRASOUND BREAST LIMITED: CPT | Mod: RT

## 2024-02-06 PROCEDURE — A4648 IMPLANTABLE TISSUE MARKER: HCPCS

## 2024-02-06 PROCEDURE — 19081 BX BREAST 1ST LESION STRTCTC: CPT | Mod: RT

## 2024-02-06 PROCEDURE — 88368 INSITU HYBRIDIZATION MANUAL: CPT | Performed by: PATHOLOGY

## 2024-02-06 PROCEDURE — 2500000005 HC RX 250 GENERAL PHARMACY W/O HCPCS: Performed by: STUDENT IN AN ORGANIZED HEALTH CARE EDUCATION/TRAINING PROGRAM

## 2024-02-06 PROCEDURE — 76942 ECHO GUIDE FOR BIOPSY: CPT | Mod: RT

## 2024-02-06 PROCEDURE — 19083 BX BREAST 1ST LESION US IMAG: CPT | Mod: RIGHT SIDE | Performed by: STUDENT IN AN ORGANIZED HEALTH CARE EDUCATION/TRAINING PROGRAM

## 2024-02-06 PROCEDURE — 38505 NEEDLE BIOPSY LYMPH NODES: CPT | Mod: RIGHT SIDE | Performed by: STUDENT IN AN ORGANIZED HEALTH CARE EDUCATION/TRAINING PROGRAM

## 2024-02-06 PROCEDURE — 88360 TUMOR IMMUNOHISTOCHEM/MANUAL: CPT | Mod: TC,SUR,STJLAB | Performed by: SURGERY

## 2024-02-06 PROCEDURE — 88360 TUMOR IMMUNOHISTOCHEM/MANUAL: CPT | Performed by: PATHOLOGY

## 2024-02-06 PROCEDURE — 88368 INSITU HYBRIDIZATION MANUAL: CPT | Mod: TC,STJLAB | Performed by: SURGERY

## 2024-02-06 PROCEDURE — 77065 DX MAMMO INCL CAD UNI: CPT | Mod: RIGHT SIDE | Performed by: STUDENT IN AN ORGANIZED HEALTH CARE EDUCATION/TRAINING PROGRAM

## 2024-02-06 PROCEDURE — 2720000003 HC TRAY FOLEY SILER W URIMETER

## 2024-02-06 PROCEDURE — 76642 ULTRASOUND BREAST LIMITED: CPT | Mod: RIGHT SIDE | Performed by: STUDENT IN AN ORGANIZED HEALTH CARE EDUCATION/TRAINING PROGRAM

## 2024-02-06 PROCEDURE — 88305 TISSUE EXAM BY PATHOLOGIST: CPT | Performed by: PATHOLOGY

## 2024-02-06 PROCEDURE — 10035 PLMT SFT TISS LOCLZJ DEV 1ST: CPT | Mod: RIGHT SIDE | Performed by: STUDENT IN AN ORGANIZED HEALTH CARE EDUCATION/TRAINING PROGRAM

## 2024-02-06 RX ADMIN — Medication 30 ML: at 16:00

## 2024-02-07 DIAGNOSIS — F43.9 STRESS: Primary | ICD-10-CM

## 2024-02-07 RX ORDER — BUPROPION HYDROCHLORIDE 300 MG/1
300 TABLET ORAL EVERY MORNING
Qty: 30 TABLET | Refills: 5 | Status: SHIPPED | OUTPATIENT
Start: 2024-02-07 | End: 2024-08-05

## 2024-02-07 RX ORDER — BUPROPION HYDROCHLORIDE 150 MG/1
150 TABLET ORAL EVERY MORNING
Qty: 7 TABLET | Refills: 0 | Status: SHIPPED | OUTPATIENT
Start: 2024-02-07 | End: 2024-04-07

## 2024-02-14 ENCOUNTER — OFFICE VISIT (OUTPATIENT)
Dept: SURGERY | Facility: HOSPITAL | Age: 54
End: 2024-02-14
Payer: COMMERCIAL

## 2024-02-14 ENCOUNTER — TELEPHONE (OUTPATIENT)
Dept: PRIMARY CARE | Facility: CLINIC | Age: 54
End: 2024-02-14

## 2024-02-14 VITALS — BODY MASS INDEX: 24.28 KG/M2 | HEIGHT: 67 IN

## 2024-02-14 DIAGNOSIS — Z17.1 MALIGNANT NEOPLASM OF RIGHT BREAST IN FEMALE, ESTROGEN RECEPTOR NEGATIVE, UNSPECIFIED SITE OF BREAST (MULTI): Primary | ICD-10-CM

## 2024-02-14 DIAGNOSIS — C50.112 MALIGNANT NEOPLASM OF CENTRAL PORTION OF LEFT BREAST IN FEMALE, ESTROGEN RECEPTOR NEGATIVE (MULTI): Primary | ICD-10-CM

## 2024-02-14 DIAGNOSIS — Z17.1 MALIGNANT NEOPLASM OF CENTRAL PORTION OF LEFT BREAST IN FEMALE, ESTROGEN RECEPTOR NEGATIVE (MULTI): Primary | ICD-10-CM

## 2024-02-14 DIAGNOSIS — C50.911 MALIGNANT NEOPLASM OF RIGHT BREAST IN FEMALE, ESTROGEN RECEPTOR NEGATIVE, UNSPECIFIED SITE OF BREAST (MULTI): Primary | ICD-10-CM

## 2024-02-14 LAB
LAB AP ASR DISCLAIMER: NORMAL
LABORATORY COMMENT REPORT: NORMAL
PATH REPORT.ADDENDUM SPEC: NORMAL
PATH REPORT.ADDENDUM SPEC: NORMAL
PATH REPORT.FINAL DX SPEC: NORMAL
PATH REPORT.GROSS SPEC: NORMAL
PATH REPORT.RELEVANT HX SPEC: NORMAL
PATH REPORT.TOTAL CANCER: NORMAL

## 2024-02-14 PROCEDURE — 99214 OFFICE O/P EST MOD 30 MIN: CPT | Performed by: SURGERY

## 2024-02-14 PROCEDURE — 1036F TOBACCO NON-USER: CPT | Performed by: SURGERY

## 2024-02-14 ASSESSMENT — ENCOUNTER SYMPTOMS
HEMATOLOGIC/LYMPHATIC NEGATIVE: 1
EYES NEGATIVE: 1
RESPIRATORY NEGATIVE: 1
CONSTITUTIONAL NEGATIVE: 1
PSYCHIATRIC NEGATIVE: 1
ALLERGIC/IMMUNOLOGIC NEGATIVE: 1
GASTROINTESTINAL NEGATIVE: 1
ENDOCRINE NEGATIVE: 1
CARDIOVASCULAR NEGATIVE: 1
NEUROLOGICAL NEGATIVE: 1
MUSCULOSKELETAL NEGATIVE: 1

## 2024-02-14 NOTE — PROGRESS NOTES
Subjective   Patient ID: Halle Rai is a 53 y.o. female who presents for Follow-up (Patient is here today for the results of the biopsy.).  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    ER negative, LA negative, HER2 equivocal 2+, SHAGGY pending.    Positive metastatic lymph node.    Review of most recent imaging shows focal area of asymmetry correlating with mass in the upper breast measures approximately 4.4 cm.    Preliminary staging cT2 N1 Mx        24: Abnormal diagnostic RIGHT mammogram with US following abnormal screening with microcalcifications noted.  Central posterior microcalcs, US of axilla with 1 of 6 indeterminate lymph node with mild cortical thickening, cat 4.     Previous screening mammogram from 2022 unremarkable.  In the month preceding this most recent mammogram, appreciated mild tenderness in the upper breast associated with subtle mass.  Marked tenderness lateral breast without mass.  Never with overlying skin change.  Sensation of itching across the upper breast transiently, now resolved.  No change in size since first appreciated.    Breast history:  Prior breast biopsy  FH: breast cancer in maternal aunt and GM (age 40s, no genetics performed), paternal GM (older age)  , first pregnancy age 21.  No breast-feeding  Menarche age 10  Uterine ablation age 35, amenorrheic since  No hormone exposure      Review of Systems   Constitutional: Negative.    HENT: Negative.     Eyes: Negative.    Respiratory: Negative.     Cardiovascular: Negative.    Gastrointestinal: Negative.    Endocrine: Negative.    Genitourinary: Negative.    Musculoskeletal: Negative.    Skin: Negative.    Allergic/Immunologic: Negative.    Neurological: Negative.    Hematological: Negative.    Psychiatric/Behavioral: Negative.          No past medical history on file.  Past Surgical History:   Procedure Laterality Date    BREAST BIOPSY      MANDIBLE FRACTURE SURGERY      NASAL SEPTUM SURGERY       Family History   Problem Relation Name Age of Onset    Heart failure Maternal Grandmother      Breast cancer Maternal Grandmother      Breast cancer Paternal Grandmother      Breast cancer Mother's Sister        Social History     Socioeconomic History    Marital status:      Spouse name: Not on file    Number of children: Not on file    Years of education: Not on file    Highest education level: Not on file   Occupational History    Not on file   Tobacco Use    Smoking status: Never    Smokeless tobacco: Never   Vaping Use    Vaping Use: Never used   Substance and Sexual Activity    Alcohol use: Yes     Comment: occ    Drug use: Never    Sexual activity: Defer   Other Topics Concern    Not on file   Social History Narrative    Not on file     Social Determinants of Health     Financial Resource Strain: Not on file   Food Insecurity: Not on file   Transportation Needs: Not on file   Physical Activity: Not on file   Stress: Not on file   Social Connections: Not on file   Intimate Partner Violence: Not on file   Housing Stability: Not on file          Current Outpatient Medications:     azelastine (Astelin) 137 mcg (0.1 %) nasal spray, Administer 2 sprays into each nostril 2 times a day. Use in each nostril as directed, Disp: 30 mL, Rfl: 2    buPROPion XL (Wellbutrin XL) 150 mg 24 hr tablet, Take 1 tablet (150 mg) by mouth once daily in the morning. Do not crush, chew, or split.  Then go to the 300mg., Disp: 7 tablet, Rfl: 0    buPROPion XL (Wellbutrin XL) 300 mg 24 hr tablet, Take 1 tablet (300 mg) by mouth once daily in the morning. Do not crush, chew, or split., Disp: 30 tablet, Rfl: 5    busPIRone (Buspar) 10 mg tablet, Take 2 tablets (20 mg) by mouth in the morning and 2 tablets (20 mg) in the evening and 2 tablets (20 mg) before bedtime.  (Patient taking differently: Take 2 tablets (20 mg) by mouth 2 times a day.), Disp: 180 tablet, Rfl: 3    montelukast (Singulair) 10 mg tablet, Take 1 tablet (10 mg) by mouth once daily at bedtime., Disp: , Rfl:     pantoprazole (ProtoNix) 40 mg EC tablet, Take 1 tablet (40 mg) by mouth once daily in the morning. Take before meals. Do not crush, chew, or split., Disp: 30 tablet, Rfl: 3    phentermine 37.5 mg capsule, TAKE 1 CAPSULE BY MOUTH IN THE MORNING BEFORE A MEAL, Disp: 30 capsule, Rfl: 0    valACYclovir (Valtrex) 1 gram tablet, Take 1 tablet (1,000 mg) by mouth 2 times a day., Disp: 60 tablet, Rfl: 2    escitalopram (Lexapro) 10 mg tablet, Take 0.5 tablet once daily for 4 days then increase to 1 full tablet daily (Patient not taking: Reported on 7/25/2023), Disp: 30 tablet, Rfl: 5    mirabegron (Myrbetriq) 50 mg tablet extended release 24 hr 24 hr tablet, Take 1 tablet (50 mg) by mouth once daily., Disp: , Rfl:     Current Facility-Administered Medications:     triamcinolone acetonide (Kenalog-40) injection 80 mg, 80 mg, intramuscular, Once, Lukas Epstein DO     Objective   There were no vitals filed for this visit.   Physical Exam  Constitutional:       General: She is not in acute distress.     Appearance: Normal appearance.   HENT:      Head: Normocephalic and atraumatic.      Mouth/Throat:      Mouth: Mucous membranes are moist.   Eyes:      Pupils: Pupils are equal, round, and reactive to light.   Cardiovascular:      Rate and Rhythm: Normal rate and regular rhythm.   Pulmonary:      Effort: Pulmonary effort is normal.   Chest:      Chest wall: No mass, swelling or tenderness.   Breasts:     Right: Mass present. No tenderness.      Left: No mass or tenderness.          Comments: Right breast with subtle firm mobile mass in the 12 o'clock position, 3 to 4 cm from the edge of the areola.  2 x 2 cm, indistinct borders.  No overlying skin change.  No nipple skin change, retraction or discharge.  Lateral  right breast focally markedly tender to palpation along the pectoral border without appreciable mass in this location.  Abdominal:      General: Abdomen is flat.      Palpations: Abdomen is soft.   Musculoskeletal:         General: Normal range of motion.      Cervical back: Normal range of motion.   Lymphadenopathy:      Cervical: No cervical adenopathy.      Upper Body:      Right upper body: No axillary adenopathy.      Left upper body: No axillary adenopathy.   Skin:     General: Skin is warm and dry.   Neurological:      Mental Status: She is alert. Mental status is at baseline.   Psychiatric:         Mood and Affect: Mood normal.         Assessment/Plan   Problem List Items Addressed This Visit    None  Visit Diagnoses         Codes    Malignant neoplasm of right breast in female, estrogen receptor negative, unspecified site of breast (CMS/[facility])    -  Primary C50.911, Z17.1          Pathology discussed in detail including management strategy involving a combination of neoadjuvant chemotherapy, surgery, radiation.  Awaiting HER2 status to guide neoadjuvant therapy.  Surgical approaches discussed including mastectomy with or without reconstruction versus breast conservation, either option to include lymph node evaluation.  Referred to medical oncology.  Educational materials provided.  Wishes to consider her options and obtain second opinion prior to deciding on therapy approach.  Will follow-up with me if and when she is ready to proceed.  Approximately 60 minutes spent in office consultation.  Reba Merino MD

## 2024-02-14 NOTE — TELEPHONE ENCOUNTER
Dr. Epstein patient    Patient says that her BP has been running high and she would like to know if she can be started on medications.     Her readings today were 176/120 at her appointment, 168/95 before she left and 152-93 when she got home.    Please advise, thank you.

## 2024-02-16 ENCOUNTER — APPOINTMENT (OUTPATIENT)
Dept: SURGERY | Facility: HOSPITAL | Age: 54
End: 2024-02-16
Payer: COMMERCIAL

## 2024-04-02 ENCOUNTER — TELEPHONE (OUTPATIENT)
Dept: PRIMARY CARE | Facility: CLINIC | Age: 54
End: 2024-04-02

## 2024-04-26 ENCOUNTER — TELEPHONE (OUTPATIENT)
Dept: PRIMARY CARE | Facility: CLINIC | Age: 54
End: 2024-04-26
Payer: COMMERCIAL

## 2024-04-26 DIAGNOSIS — K21.9 GASTROESOPHAGEAL REFLUX DISEASE, UNSPECIFIED WHETHER ESOPHAGITIS PRESENT: ICD-10-CM

## 2024-04-29 RX ORDER — PANTOPRAZOLE SODIUM 40 MG/1
40 TABLET, DELAYED RELEASE ORAL
Qty: 90 TABLET | Refills: 3 | Status: SHIPPED | OUTPATIENT
Start: 2024-04-29

## 2024-06-12 DIAGNOSIS — B00.9 HERPES SIMPLEX: ICD-10-CM

## 2024-06-12 RX ORDER — VALACYCLOVIR HYDROCHLORIDE 1 G/1
1000 TABLET, FILM COATED ORAL 2 TIMES DAILY
Qty: 60 TABLET | Refills: 2 | Status: CANCELLED | OUTPATIENT
Start: 2024-06-12

## 2024-06-17 NOTE — TELEPHONE ENCOUNTER
Valacyclovir 1gm 1 tab BID #60 2 refills called into [facility]. Per Joseph - pharmacist they never received prescription.  Ok per provider. Patient has been called and updated

## 2024-10-29 ENCOUNTER — OFFICE VISIT (OUTPATIENT)
Dept: PRIMARY CARE | Facility: CLINIC | Age: 54
End: 2024-10-29
Payer: COMMERCIAL

## 2024-10-29 VITALS
OXYGEN SATURATION: 98 % | TEMPERATURE: 96.9 F | RESPIRATION RATE: 16 BRPM | HEART RATE: 91 BPM | DIASTOLIC BLOOD PRESSURE: 80 MMHG | BODY MASS INDEX: 24.65 KG/M2 | SYSTOLIC BLOOD PRESSURE: 124 MMHG | HEIGHT: 66 IN | WEIGHT: 153.4 LBS

## 2024-10-29 DIAGNOSIS — J01.10 ACUTE NON-RECURRENT FRONTAL SINUSITIS: Primary | ICD-10-CM

## 2024-10-29 PROCEDURE — 99214 OFFICE O/P EST MOD 30 MIN: CPT | Performed by: NURSE PRACTITIONER

## 2024-10-29 RX ORDER — AMOXICILLIN AND CLAVULANATE POTASSIUM 875; 125 MG/1; MG/1
875 TABLET, FILM COATED ORAL 2 TIMES DAILY
Qty: 20 TABLET | Refills: 0 | Status: SHIPPED | OUTPATIENT
Start: 2024-10-29 | End: 2024-11-08

## 2024-10-29 ASSESSMENT — ENCOUNTER SYMPTOMS
ABDOMINAL DISTENTION: 0
PALPITATIONS: 0
ADENOPATHY: 0
COLOR CHANGE: 0
COUGH: 0
FATIGUE: 1
WHEEZING: 0
SINUS PRESSURE: 1
HEADACHES: 1
FEVER: 1
DIAPHORESIS: 0
SORE THROAT: 0
SINUS PAIN: 1
DIZZINESS: 0
CHILLS: 0
ARTHRALGIAS: 0
RHINORRHEA: 1
ACTIVITY CHANGE: 0
CONSTIPATION: 0

## 2024-10-29 ASSESSMENT — PATIENT HEALTH QUESTIONNAIRE - PHQ9
SUM OF ALL RESPONSES TO PHQ9 QUESTIONS 1 AND 2: 0
2. FEELING DOWN, DEPRESSED OR HOPELESS: NOT AT ALL
1. LITTLE INTEREST OR PLEASURE IN DOING THINGS: NOT AT ALL

## 2024-11-05 DIAGNOSIS — F32.A DEPRESSION, UNSPECIFIED DEPRESSION TYPE: ICD-10-CM

## 2024-11-05 DIAGNOSIS — F43.9 STRESS: ICD-10-CM

## 2024-11-05 RX ORDER — BUPROPION HYDROCHLORIDE 300 MG/1
300 TABLET ORAL EVERY MORNING
Qty: 90 TABLET | Refills: 5 | Status: SHIPPED | OUTPATIENT
Start: 2024-11-05

## 2024-11-05 RX ORDER — BUSPIRONE HYDROCHLORIDE 10 MG/1
20 TABLET ORAL 2 TIMES DAILY
Qty: 180 TABLET | Refills: 1 | Status: SHIPPED | OUTPATIENT
Start: 2024-11-05

## 2024-11-05 NOTE — TELEPHONE ENCOUNTER
Dr. Epstein patient  Refill for busPIRone (Buspar) 15 mg tablet - patient states she takes 1 1/2 tablet in the evening  buPROPion XL (Wellbutrin XL) 300 mg 24 hr tablet   [facility] [place] [facility] [place] [facility] [place]

## 2024-11-12 ENCOUNTER — APPOINTMENT (OUTPATIENT)
Dept: PRIMARY CARE | Facility: CLINIC | Age: 54
End: 2024-11-12
Payer: COMMERCIAL

## 2024-11-12 VITALS
SYSTOLIC BLOOD PRESSURE: 130 MMHG | HEART RATE: 71 BPM | WEIGHT: 151 LBS | OXYGEN SATURATION: 99 % | TEMPERATURE: 98 F | BODY MASS INDEX: 23.7 KG/M2 | HEIGHT: 67 IN | RESPIRATION RATE: 16 BRPM | DIASTOLIC BLOOD PRESSURE: 80 MMHG

## 2024-11-12 DIAGNOSIS — R09.A2 GLOBUS SENSATION: Primary | ICD-10-CM

## 2024-11-12 DIAGNOSIS — L29.9 ITCHING: ICD-10-CM

## 2024-11-12 PROCEDURE — 99213 OFFICE O/P EST LOW 20 MIN: CPT | Performed by: NURSE PRACTITIONER

## 2024-11-12 ASSESSMENT — ENCOUNTER SYMPTOMS
WEAKNESS: 0
DIARRHEA: 0
NAUSEA: 0
ABDOMINAL PAIN: 0
VOMITING: 0
CHILLS: 0
SHORTNESS OF BREATH: 0
COUGH: 0
PALPITATIONS: 0
VOICE CHANGE: 0
TROUBLE SWALLOWING: 1
FEVER: 0
SORE THROAT: 0
DEPRESSION: 0
SINUS PRESSURE: 0
EYE REDNESS: 0
SINUS PAIN: 0
DIZZINESS: 0
HEADACHES: 0
CONSTIPATION: 0
ROS SKIN COMMENTS: ITCHING

## 2024-11-12 NOTE — PROGRESS NOTES
Subjective   Patient ID: Halle Rai is a 54 y.o. female who presents for Throat Problem.    Patient is being seen today for throat problem, She states she feels a lump in her throat. She denies feeling any pain and it has been there for about 2 weeks. She also complains of itchiness all over her body and she feels like it could be from an antibiotic but is not sure.      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 She states that she first noticed the symptoms 2 weeks ago when she was in a treated for a sinus infection. It was recommended that she restart her protonix at that time. She did and has noticed some improvement, but states that it is still there. She had a similar episode 2-3 years ago and was started on protonix at that time. She stopped the medication earlier this year while receiving treatment for her breast cancer. She is currently in radiation treatment.   She is also complaining of itchiness that is worse at night since taking her antibiotic. She denies any rash, but has been experiencing some dry skin. She did take Benadryl last night which did seem to help.     Review of Systems   Constitutional:  Negative for chills and fever.   HENT:  Positive for trouble swallowing. Negative for congestion, ear pain, sinus pressure, sinus pain, sore throat and voice change.    Eyes:  Negative for redness and visual disturbance.   Respiratory:  Negative for cough and shortness of breath.    Cardiovascular:  Negative for chest pain and palpitations.   Gastrointestinal:  Negative for abdominal pain, constipation, diarrhea, nausea and vomiting.   Skin:  Negative for rash.        Itching   Neurological:  Negative for dizziness, weakness and headaches.       Objective   /80 (BP Location: Left arm, Patient Position: Sitting, BP Cuff Size: Adult)   Pulse 71   Temp 36.7 °C (98 °F)  "(Temporal)   Resp 16   Ht 1.702 m (5' 7\")   Wt 68.5 kg (151 lb)   SpO2 99%   BMI 23.65 kg/m²     Physical Exam  Vitals and nursing note reviewed.   Constitutional:       General: She is not in acute distress.     Appearance: Normal appearance. She is normal weight.   HENT:      Head: Normocephalic and atraumatic.      Nose: No congestion.      Mouth/Throat:      Mouth: Mucous membranes are moist.      Pharynx: Oropharynx is clear. No oropharyngeal exudate or posterior oropharyngeal erythema.   Eyes:      Conjunctiva/sclera: Conjunctivae normal.   Neck:      Thyroid: No thyroid mass or thyroid tenderness.   Cardiovascular:      Rate and Rhythm: Normal rate and regular rhythm.      Heart sounds: Normal heart sounds.   Pulmonary:      Effort: Pulmonary effort is normal.      Breath sounds: Normal breath sounds.   Abdominal:      General: Abdomen is flat. Bowel sounds are normal.      Palpations: Abdomen is soft.      Tenderness: There is no abdominal tenderness.   Musculoskeletal:      Right lower leg: No edema.      Left lower leg: No edema.   Lymphadenopathy:      Cervical: No cervical adenopathy.   Skin:     General: Skin is warm and dry.   Neurological:      Mental Status: She is alert.   Psychiatric:         Mood and Affect: Mood normal.         Behavior: Behavior normal.         Assessment/Plan   Problem List Items Addressed This Visit    None  Visit Diagnoses         Codes    Globus sensation    -  Primary R09.A2    Itching     L29.9    BMI 23.0-23.9, adult     Z68.23        Continue with protonix daily.   Continue with benadryl as needed for itching. Encouraged good moisturizing routine.   Follow up in 1 month for recheck, or sooner with any additional concerns.       "

## 2024-12-12 ENCOUNTER — APPOINTMENT (OUTPATIENT)
Dept: PRIMARY CARE | Facility: CLINIC | Age: 54
End: 2024-12-12
Payer: COMMERCIAL

## 2025-02-04 ENCOUNTER — APPOINTMENT (OUTPATIENT)
Dept: PRIMARY CARE | Facility: CLINIC | Age: 55
End: 2025-02-04
Payer: COMMERCIAL

## 2025-02-04 VITALS
WEIGHT: 154 LBS | BODY MASS INDEX: 24.17 KG/M2 | HEIGHT: 67 IN | SYSTOLIC BLOOD PRESSURE: 108 MMHG | OXYGEN SATURATION: 98 % | RESPIRATION RATE: 16 BRPM | DIASTOLIC BLOOD PRESSURE: 70 MMHG | TEMPERATURE: 97.9 F | HEART RATE: 90 BPM

## 2025-02-04 DIAGNOSIS — C50.911 MALIGNANT NEOPLASM OF RIGHT BREAST IN FEMALE, ESTROGEN RECEPTOR NEGATIVE, UNSPECIFIED SITE OF BREAST: ICD-10-CM

## 2025-02-04 DIAGNOSIS — D72.819 LEUKOPENIA, UNSPECIFIED TYPE: ICD-10-CM

## 2025-02-04 DIAGNOSIS — R53.83 OTHER FATIGUE: ICD-10-CM

## 2025-02-04 DIAGNOSIS — Z12.11 COLON CANCER SCREENING: Primary | ICD-10-CM

## 2025-02-04 DIAGNOSIS — Z17.1 MALIGNANT NEOPLASM OF RIGHT BREAST IN FEMALE, ESTROGEN RECEPTOR NEGATIVE, UNSPECIFIED SITE OF BREAST: ICD-10-CM

## 2025-02-04 PROCEDURE — 99214 OFFICE O/P EST MOD 30 MIN: CPT | Performed by: FAMILY MEDICINE

## 2025-02-04 ASSESSMENT — ENCOUNTER SYMPTOMS
CONSTIPATION: 0
DIARRHEA: 0
ACTIVITY CHANGE: 0
ARTHRALGIAS: 0
STRIDOR: 0
FLANK PAIN: 0
RECTAL PAIN: 0
COLOR CHANGE: 0
POLYPHAGIA: 0
EYE PAIN: 0
FEVER: 0
ADENOPATHY: 0
HEADACHES: 0
NERVOUS/ANXIOUS: 0
DECREASED CONCENTRATION: 0
ABDOMINAL DISTENTION: 0
SPEECH DIFFICULTY: 0
POLYDIPSIA: 0
HEMATURIA: 0
RHINORRHEA: 0
SINUS PAIN: 0
PHOTOPHOBIA: 0
AGITATION: 0
PALPITATIONS: 0
DIZZINESS: 0
TROUBLE SWALLOWING: 0
MYALGIAS: 0
SINUS PRESSURE: 0
DYSPHORIC MOOD: 0
DYSURIA: 0
NECK STIFFNESS: 0
CHEST TIGHTNESS: 0
APPETITE CHANGE: 0
SORE THROAT: 0
ABDOMINAL PAIN: 0
FATIGUE: 0
SLEEP DISTURBANCE: 0
SEIZURES: 0
CONFUSION: 0
CONSTITUTIONAL NEGATIVE: 1
COUGH: 0
BLOOD IN STOOL: 0
SHORTNESS OF BREATH: 0

## 2025-02-04 NOTE — PROGRESS NOTES
"Subjective   Patient ID: Halle Rai is a 54 y.o. female who presents for Annual Exam.    Patient presents in office for an annual exam. Patient has no specific requirements for this examination.          Review of Systems   Constitutional: Negative.  Negative for activity change, appetite change, fatigue and fever.   HENT:  Negative for congestion, dental problem, ear discharge, ear pain, mouth sores, rhinorrhea, sinus pressure, sinus pain, sore throat, tinnitus and trouble swallowing.    Eyes:  Negative for photophobia, pain and visual disturbance.   Respiratory:  Negative for cough, chest tightness, shortness of breath and stridor.    Cardiovascular:  Negative for chest pain and palpitations.   Gastrointestinal:  Negative for abdominal distention, abdominal pain, blood in stool, constipation, diarrhea and rectal pain.   Endocrine: Negative for cold intolerance, heat intolerance, polydipsia, polyphagia and polyuria.   Genitourinary:  Negative for dysuria, flank pain, hematuria and urgency.   Musculoskeletal:  Negative for arthralgias, gait problem, myalgias and neck stiffness.   Skin:  Negative for color change and rash.   Allergic/Immunologic: Negative for environmental allergies and food allergies.   Neurological:  Negative for dizziness, seizures, syncope, speech difficulty and headaches.   Hematological:  Negative for adenopathy.   Psychiatric/Behavioral:  Negative for agitation, confusion, decreased concentration, dysphoric mood and sleep disturbance. The patient is not nervous/anxious.        Objective   /70 (BP Location: Left arm, Patient Position: Sitting, BP Cuff Size: Adult)   Pulse 90   Temp 36.6 °C (97.9 °F) (Temporal)   Resp 16   Ht 1.702 m (5' 7\")   Wt 69.9 kg (154 lb)   SpO2 98%   BMI 24.12 kg/m²     Physical Exam    Assessment/Plan   Problem List Items Addressed This Visit             ICD-10-CM    Leukopenia D72.819    Malignant neoplasm of right breast in female, estrogen receptor " negative C50.911, Z17.1    Relevant Orders    CBC and Auto Differential    Lipid panel    Vitamin D 25-Hydroxy,Total (for eval of Vitamin D levels)    Iron and TIBC    Folate    Vitamin B12     Other Visit Diagnoses         Codes    Colon cancer screening    -  Primary Z12.11    Relevant Orders    Colonoscopy Screening; Average Risk Patient    Other fatigue     R53.83    Relevant Orders    CBC and Auto Differential    Lipid panel    Vitamin D 25-Hydroxy,Total (for eval of Vitamin D levels)    Iron and TIBC    Folate    Vitamin B12

## 2025-02-08 LAB
25(OH)D3+25(OH)D2 SERPL-MCNC: 26 NG/ML (ref 30–100)
BASOPHILS # BLD AUTO: 10 CELLS/UL (ref 0–200)
BASOPHILS NFR BLD AUTO: 0.4 %
CHOLEST SERPL-MCNC: 220 MG/DL
CHOLEST/HDLC SERPL: 4.2 (CALC)
EOSINOPHIL # BLD AUTO: 298 CELLS/UL (ref 15–500)
EOSINOPHIL NFR BLD AUTO: 12.4 %
ERYTHROCYTE [DISTWIDTH] IN BLOOD BY AUTOMATED COUNT: 13.9 % (ref 11–15)
FOLATE SERPL-MCNC: >24 NG/ML
HCT VFR BLD AUTO: 30.9 % (ref 35–45)
HDLC SERPL-MCNC: 53 MG/DL
HGB BLD-MCNC: 10.2 G/DL (ref 11.7–15.5)
IRON SATN MFR SERPL: 19 % (CALC) (ref 16–45)
IRON SERPL-MCNC: 59 MCG/DL (ref 45–160)
LDLC SERPL CALC-MCNC: 152 MG/DL (CALC)
LYMPHOCYTES # BLD AUTO: 485 CELLS/UL (ref 850–3900)
LYMPHOCYTES NFR BLD AUTO: 20.2 %
MCH RBC QN AUTO: 33.1 PG (ref 27–33)
MCHC RBC AUTO-ENTMCNC: 33 G/DL (ref 32–36)
MCV RBC AUTO: 100.3 FL (ref 80–100)
MONOCYTES # BLD AUTO: 218 CELLS/UL (ref 200–950)
MONOCYTES NFR BLD AUTO: 9.1 %
NEUTROPHILS # BLD AUTO: 1390 CELLS/UL (ref 1500–7800)
NEUTROPHILS NFR BLD AUTO: 57.9 %
NONHDLC SERPL-MCNC: 167 MG/DL (CALC)
PLATELET # BLD AUTO: 202 THOUSAND/UL (ref 140–400)
PMV BLD REES-ECKER: 9.6 FL (ref 7.5–12.5)
RBC # BLD AUTO: 3.08 MILLION/UL (ref 3.8–5.1)
TIBC SERPL-MCNC: 318 MCG/DL (CALC) (ref 250–450)
TRIGL SERPL-MCNC: 60 MG/DL
VIT B12 SERPL-MCNC: 405 PG/ML (ref 200–1100)
WBC # BLD AUTO: 2.4 THOUSAND/UL (ref 3.8–10.8)

## 2025-02-10 ENCOUNTER — TELEPHONE (OUTPATIENT)
Dept: PRIMARY CARE | Facility: CLINIC | Age: 55
End: 2025-02-10
Payer: COMMERCIAL

## 2025-02-11 DIAGNOSIS — E55.9 VITAMIN D DEFICIENCY: Primary | ICD-10-CM

## 2025-02-11 RX ORDER — ERGOCALCIFEROL 1.25 MG/1
50000 CAPSULE ORAL WEEKLY
Qty: 12 CAPSULE | Refills: 3 | Status: SHIPPED | OUTPATIENT
Start: 2025-02-11

## 2025-04-15 ENCOUNTER — OFFICE VISIT (OUTPATIENT)
Dept: PRIMARY CARE | Facility: CLINIC | Age: 55
End: 2025-04-15
Payer: COMMERCIAL

## 2025-04-15 VITALS
SYSTOLIC BLOOD PRESSURE: 130 MMHG | WEIGHT: 162 LBS | OXYGEN SATURATION: 93 % | RESPIRATION RATE: 16 BRPM | TEMPERATURE: 58 F | HEART RATE: 80 BPM | BODY MASS INDEX: 25.43 KG/M2 | DIASTOLIC BLOOD PRESSURE: 70 MMHG | HEIGHT: 67 IN

## 2025-04-15 DIAGNOSIS — K21.9 GASTROESOPHAGEAL REFLUX DISEASE WITHOUT ESOPHAGITIS: ICD-10-CM

## 2025-04-15 DIAGNOSIS — J18.9 PNEUMONIA OF RIGHT MIDDLE LOBE DUE TO INFECTIOUS ORGANISM: ICD-10-CM

## 2025-04-15 DIAGNOSIS — F32.A DEPRESSION, UNSPECIFIED DEPRESSION TYPE: ICD-10-CM

## 2025-04-15 DIAGNOSIS — F41.9 ANXIETY: ICD-10-CM

## 2025-04-15 DIAGNOSIS — D84.821 IMMUNODEFICIENCY DUE TO DRUGS (CODE): Primary | ICD-10-CM

## 2025-04-15 DIAGNOSIS — B37.31 YEAST VAGINITIS: ICD-10-CM

## 2025-04-15 DIAGNOSIS — J30.1 ALLERGIC RHINITIS DUE TO POLLEN, UNSPECIFIED SEASONALITY: ICD-10-CM

## 2025-04-15 PROCEDURE — 96372 THER/PROPH/DIAG INJ SC/IM: CPT | Performed by: FAMILY MEDICINE

## 2025-04-15 PROCEDURE — 3008F BODY MASS INDEX DOCD: CPT | Performed by: FAMILY MEDICINE

## 2025-04-15 PROCEDURE — 99214 OFFICE O/P EST MOD 30 MIN: CPT | Performed by: FAMILY MEDICINE

## 2025-04-15 PROCEDURE — 1036F TOBACCO NON-USER: CPT | Performed by: FAMILY MEDICINE

## 2025-04-15 RX ORDER — ONDANSETRON HYDROCHLORIDE 8 MG/1
8 TABLET, FILM COATED ORAL EVERY 12 HOURS PRN
COMMUNITY
Start: 2024-07-15

## 2025-04-15 RX ORDER — CEFDINIR 300 MG/1
300 CAPSULE ORAL 2 TIMES DAILY
Qty: 20 CAPSULE | Refills: 0 | Status: SHIPPED | OUTPATIENT
Start: 2025-04-15 | End: 2025-04-25

## 2025-04-15 RX ORDER — FLUCONAZOLE 150 MG/1
150 TABLET ORAL ONCE
Qty: 1 TABLET | Refills: 6 | Status: SHIPPED | OUTPATIENT
Start: 2025-04-15 | End: 2025-04-15

## 2025-04-15 ASSESSMENT — PATIENT HEALTH QUESTIONNAIRE - PHQ9
1. LITTLE INTEREST OR PLEASURE IN DOING THINGS: NOT AT ALL
2. FEELING DOWN, DEPRESSED OR HOPELESS: NOT AT ALL
SUM OF ALL RESPONSES TO PHQ9 QUESTIONS 1 AND 2: 0

## 2025-04-15 ASSESSMENT — ENCOUNTER SYMPTOMS
OCCASIONAL FEELINGS OF UNSTEADINESS: 0
LOSS OF SENSATION IN FEET: 0
DEPRESSION: 0

## 2025-04-15 NOTE — PROGRESS NOTES
Subjective   Patient ID: Halle Rai is a 54 y.o. female who presents for Pneumonia (Patient reports that after Chest XR performed today at [facility] she was told to follow up with PCP due to possible pneumonia. Patient reports that she is is presenting cough. Onset of symptoms 1 week. No medications used to manage symptoms.).  History of Present Illness  Halle Rai is a 54 year old female with a history of triple negative inflammatory breast cancer who presents with a cough.    She has been experiencing a cough for about a week without fever or chills. She feels fatigued, which she attributes to her recent cancer treatment. She is uncertain about her symptoms due to the recent completion of her treatment.    She has a history of aggressive and rare triple negative inflammatory breast cancer, which involved the right lymph nodes. She underwent chemotherapy, immunotherapy, radiation, and a bilateral mastectomy. She completed her cancer treatment recently. She was initially misdiagnosed at stage two after a biopsy, but a second opinion revealed stage three B inflammatory breast cancer.    She had an appendectomy before starting chemotherapy due to an enlarged appendix. She also experienced an infection in her port, requiring hospitalization for ten days and a subsequent port replacement. Her port was removed two weeks ago.    She has no known allergies to antibiotics and has not experienced issues with medications like Z-Leo in the past. She is proactive about preventing yeast infections and regularly consumes yogurt with active cultures.    There is no family history of triple negative breast cancer, although a cousin had breast cancer. Genetic testing was negative for known mutations, which she notes is important for her daughter.    Review of Systems  12 Systems have been reviewed as follows.  Constitutional: Fever, weight gain, weight loss, appetite change, night sweats, fatigue, chills.  Eyes : blurry, double  "vision, vision, loss, tearing, redness, pain, sensitivity to light, glaucoma.  Ears, nose, mouth, and throat: Hearing loss, ringing in the ears, ear pain, nasal congestion, nasal drainage, nosebleeds, mouth, throat, irritation tooth problem.  Cardiovascular :chest pain, pressure, heart racing, palpitations, sweating, leg swelling, high or low blood pressure  Pulmonary: Cough, yellow or green sputum, blood and sputum, shortness of breath, wheezing  Gastrointestinal: Nausea, vomiting, diarrhea, constipation, pain, blood in stool, or vomitus, heartburn, difficulty swallowing  Genitourinary: incontinence, abnormal bleeding, abnormal discharge, urinary frequency, urinary hesitancy, pain, impotence sexual problem, infection, urinary retention  Musculoskeletal: Pain, stiffness, joint, redness or warmth, arthritis, back pain, weakness, muscle wasting, sprain or fracture  Neuro: Weight weakness, dizziness, change in voice, change in taste change in vision, change in hearing, loss, or change of sensation, trouble walking, balance problems coordination problems, shaking, speech problem  Endocrine , cold or heat intolerance, blood sugar problem, weight gain or loss missed periods hot flashes, sweats, change in body hair, change in libido, increased thirst, increased urination  Heme/lymph: Swelling, bleeding, problem anemia, bruising, enlarged lymph nodes  Allergic/immunologic: H. plus nasal drip, watery itchy eyes, nasal drainage, immunosuppressed  The above were reviewed and noted negative except as noted in HPI and Problem List.    Objective     /70 (BP Location: Left arm, Patient Position: Sitting, BP Cuff Size: Adult)   Pulse 80   Temp (!) 14.4 °C (58 °F) (Temporal)   Resp 16   Ht 1.702 m (5' 7\")   Wt 73.5 kg (162 lb)   SpO2 93%   BMI 25.37 kg/m²      Physical Exam    Constitutional: Well developed, well nourished, alert and in no acute distress   Eyes: Normal external exam. Pupils equally round and reactive to " light with normal accommodation and extraocular movements intact.  Neck: Supple, no lymphadenopathy or masses.   Cardiovascular: Regular rate and rhythm, normal S1 and S2, no murmurs, gallops, or rubs. Radial pulses normal. No peripheral edema.  Pulmonary: No respiratory distress, lungs clear to auscultation bilaterally. No wheezes, rhonchi, rales.  Abdomen: soft,non tender, non distended, without masses or HSM  Skin: Warm, well perfused, normal skin turgor and color.   Neurologic: Cranial nerves II-XII grossly intact.   Psychiatric: Mood calm and affect normal  Musculoskeletal: Moving all extremities without restriction  The above were reviewed and noted negative except as noted in HPI and Problem List.      Results  RADIOLOGY  Chest x-ray: New patchy infiltrate, suspicious for pneumonia (04/14/2025)  CT scan: Enlarged appendix    PATHOLOGY  Biopsy: Inflammatory breast carcinoma         Assessment & Plan  Pneumonia    She presents with a cough lasting about a week, without fever or chills. The chest x-ray indicates a new patchy infiltrate suspicious for pneumonia. Given her recent completion of breast cancer treatment and potential immunosuppression, there is a heightened concern for bacterial pneumonia despite the absence of fever. Both the oncologist and radiologist suspect pneumonia rather than a recurrence of cancer. Due to her immunosuppressed state, initiating antibiotic treatment is prudent to prevent complications.    - Administer Rocephin (ceftriaxone) to initiate treatment.    - Prescribe Omnicef (cefdinir) 300 mg, two pills once a day.    - Prescribe Diflucan (fluconazole) for potential yeast infection, with one pill and six refills.    - Advise consumption of yogurt with active cultures to maintain gut delroy.    - Instruct her to monitor symptoms and report if not improved in ten days.      Breast Cancer (Triple Negative Inflammatory)    She has rare and aggressive triple negative inflammatory breast  cancer, initially misdiagnosed as stage 2 but later identified as stage 3B. She has completed chemotherapy, immunotherapy, radiation, and bilateral mastectomy. The cancer was located in the right lymph nodes. Genetic testing was negative for hereditary breast cancer markers.    - Ensure follow-up with oncologist as needed.    - Monitor for signs of recurrence.      General Health Maintenance    Advised to maintain general health through dietary measures to support gut health, especially in light of antibiotic use.    - Encourage daily consumption of yogurt with active cultures.      Follow-up    She does not have a scheduled follow-up with her oncologist, Dr. Epstein, and may need to switch to another provider due to his limited availability. A new family medicine physician is expected to join in the summer, which may ease scheduling difficulties.    - Arrange follow-up with Dr. Epstein or another oncologist as needed.    - Consider transitioning to a new family medicine physician arriving in the summer for ongoing care.    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    Relevant Medications    cefdinir (Omnicef) 300 mg capsule    Other Relevant Orders    Follow Up In [facility] - PCP - Established     Other Visit Diagnoses       Pneumonia of right middle lobe due to infectious organism        Relevant Medications    cefdinir (Omnicef) 300 mg capsule    Other Relevant Orders    Follow Up In [facility] - PCP - Established    Yeast vaginitis        Relevant  Medications    fluconazole (Diflucan) 150 mg tablet    Other Relevant Orders    Follow Up In [facility] - PCP - Established           Tony Lou MD       This medical note was created with the assistance of artificial intelligence (AI) for documentation purposes. The content has been reviewed and confirmed by the healthcare provider for accuracy and completeness. Patient consented to the use of audio recording and use of AI during their visit.

## 2025-04-30 DIAGNOSIS — J01.90 ACUTE BACTERIAL SINUSITIS: Primary | ICD-10-CM

## 2025-04-30 DIAGNOSIS — B96.89 ACUTE BACTERIAL SINUSITIS: Primary | ICD-10-CM

## 2025-04-30 RX ORDER — CEFUROXIME AXETIL 500 MG/1
500 TABLET ORAL 2 TIMES DAILY
Qty: 20 TABLET | Refills: 0 | Status: SHIPPED | OUTPATIENT
Start: 2025-04-30 | End: 2025-05-10

## 2025-05-06 ENCOUNTER — TELEPHONE (OUTPATIENT)
Dept: PRIMARY CARE | Facility: CLINIC | Age: 55
End: 2025-05-06

## 2025-05-06 DIAGNOSIS — J40 BRONCHITIS: ICD-10-CM

## 2025-05-06 NOTE — TELEPHONE ENCOUNTER
PT JOSIAH DEVINE     PT SEEN [facility] ON 04/15, PT CALLED IN WONDERING IF [facility] WANTED HER TO HAVE ANOTHER CHEST XRAY DONE BEFORE HER F/U VISIT       PLEASE ADVISE

## 2025-05-21 ENCOUNTER — HOSPITAL ENCOUNTER (OUTPATIENT)
Dept: RADIOLOGY | Facility: HOSPITAL | Age: 55
Discharge: HOME | End: 2025-05-21
Payer: COMMERCIAL

## 2025-05-21 DIAGNOSIS — J40 BRONCHITIS: ICD-10-CM

## 2025-05-21 PROCEDURE — 71046 X-RAY EXAM CHEST 2 VIEWS: CPT

## 2025-05-21 PROCEDURE — 71046 X-RAY EXAM CHEST 2 VIEWS: CPT | Performed by: RADIOLOGY

## 2025-05-22 ENCOUNTER — TELEPHONE (OUTPATIENT)
Dept: PRIMARY CARE | Facility: CLINIC | Age: 55
End: 2025-05-22

## 2025-05-22 DIAGNOSIS — R93.89 ABNORMAL CHEST X-RAY: ICD-10-CM

## 2025-05-22 DIAGNOSIS — C50.911 MALIGNANT NEOPLASM OF RIGHT BREAST IN FEMALE, ESTROGEN RECEPTOR NEGATIVE, UNSPECIFIED SITE OF BREAST: ICD-10-CM

## 2025-05-22 DIAGNOSIS — Z17.1 MALIGNANT NEOPLASM OF RIGHT BREAST IN FEMALE, ESTROGEN RECEPTOR NEGATIVE, UNSPECIFIED SITE OF BREAST: ICD-10-CM

## 2025-05-22 NOTE — TELEPHONE ENCOUNTER
Dr. Epstein Pt    Pt calling to ask JE or MA for call back to go over Xray from Monday. Pt would like to know results before the FUV w/ JE. Please advise, thank you.    Cici appiah to [facility]  [phone]

## 2025-05-24 NOTE — TELEPHONE ENCOUNTER
Patient of Dr. Lou'  Patient did get the x ray done and the radiologist told her she might need to get ct scan. She is asking if  an order for ct can be put in

## 2025-05-29 ENCOUNTER — APPOINTMENT (OUTPATIENT)
Dept: PRIMARY CARE | Facility: CLINIC | Age: 55
End: 2025-05-29
Payer: COMMERCIAL

## 2025-05-29 VITALS
WEIGHT: 160 LBS | DIASTOLIC BLOOD PRESSURE: 90 MMHG | OXYGEN SATURATION: 97 % | TEMPERATURE: 97.8 F | HEIGHT: 67 IN | HEART RATE: 84 BPM | SYSTOLIC BLOOD PRESSURE: 150 MMHG | BODY MASS INDEX: 25.11 KG/M2 | RESPIRATION RATE: 16 BRPM

## 2025-05-29 DIAGNOSIS — K21.9 GASTROESOPHAGEAL REFLUX DISEASE WITHOUT ESOPHAGITIS: ICD-10-CM

## 2025-05-29 DIAGNOSIS — C50.919 TRIPLE NEGATIVE BREAST CANCER: ICD-10-CM

## 2025-05-29 DIAGNOSIS — Z17.1 MALIGNANT NEOPLASM INVOLVING BOTH NIPPLE AND AREOLA OF RIGHT BREAST IN FEMALE, ESTROGEN RECEPTOR NEGATIVE: ICD-10-CM

## 2025-05-29 DIAGNOSIS — J30.1 ALLERGIC RHINITIS DUE TO POLLEN, UNSPECIFIED SEASONALITY: ICD-10-CM

## 2025-05-29 DIAGNOSIS — Z17.421 TRIPLE NEGATIVE BREAST CANCER: ICD-10-CM

## 2025-05-29 DIAGNOSIS — F41.9 ANXIETY: ICD-10-CM

## 2025-05-29 DIAGNOSIS — D84.821 IMMUNODEFICIENCY DUE TO DRUGS (CODE): ICD-10-CM

## 2025-05-29 DIAGNOSIS — C50.911 HER2-NEGATIVE CARCINOMA OF RIGHT BREAST: ICD-10-CM

## 2025-05-29 DIAGNOSIS — C50.911 BREAST CANCER METASTASIZED TO AXILLARY LYMPH NODE, RIGHT: ICD-10-CM

## 2025-05-29 DIAGNOSIS — C77.3 BREAST CANCER METASTASIZED TO AXILLARY LYMPH NODE, RIGHT: ICD-10-CM

## 2025-05-29 DIAGNOSIS — J18.9 PNEUMONIA OF RIGHT MIDDLE LOBE DUE TO INFECTIOUS ORGANISM: ICD-10-CM

## 2025-05-29 DIAGNOSIS — Z17.32 HER2-NEGATIVE CARCINOMA OF RIGHT BREAST: ICD-10-CM

## 2025-05-29 DIAGNOSIS — F32.A DEPRESSION, UNSPECIFIED DEPRESSION TYPE: ICD-10-CM

## 2025-05-29 DIAGNOSIS — C50.811 CANCER OF OVERLAPPING SITES OF RIGHT BREAST: ICD-10-CM

## 2025-05-29 DIAGNOSIS — B37.31 YEAST VAGINITIS: ICD-10-CM

## 2025-05-29 DIAGNOSIS — C50.011 MALIGNANT NEOPLASM INVOLVING BOTH NIPPLE AND AREOLA OF RIGHT BREAST IN FEMALE, ESTROGEN RECEPTOR NEGATIVE: ICD-10-CM

## 2025-05-29 PROCEDURE — 99214 OFFICE O/P EST MOD 30 MIN: CPT | Performed by: FAMILY MEDICINE

## 2025-05-29 RX ORDER — CEFDINIR 300 MG/1
300 CAPSULE ORAL 2 TIMES DAILY
Qty: 20 CAPSULE | Refills: 0 | Status: SHIPPED | OUTPATIENT
Start: 2025-05-29 | End: 2025-06-08

## 2025-05-29 NOTE — PROGRESS NOTES
Subjective   Patient ID: Halle Rai is a 55 y.o. female who presents for Results (Chest X-RAY from 5/21) and Medication Problem (Plz discuss vit D).  History of Present Illness  The patient presents for evaluation of a persistent cough.    She has been experiencing this cough since 02/2025 or 03/2025, which coincided with her cancer treatment. Despite completing her treatment regimen, she reports no improvement in her symptoms. Her last radiation session was in 11/2024, and the final immunotherapy session was at the end of 02/2025. Initially, she suspected radiation pneumonitis, but a chest x-ray ordered by her radiation oncologist revealed pneumonia. A follow-up chest x-ray suggested an axillary condition, prompting a recommendation for a CT scan. The scan is scheduled for Saturday.    She was administered Rocephin, Omnicef, and prednisone, but these treatments did not alleviate her symptoms. She was seen on 05/15/2025. Subsequently, she consulted with Dr. Gonzalez, who prescribed an additional course of antibiotics, which she completed without any side effects. However, she continues to experience a non-productive cough, fatigue, and light green nasal discharge. She also reports chest and back pain when coughing. She continues to work and is concerned about the potential need for intravenous antibiotics and the possibility of fluid accumulation in her lungs. She also expresses worry about the long-term use of steroids and antibiotics.    She has a history of breast cancer, specifically triple-negative inflammatory breast cancer.  See Above  Review of Systems  12 Systems have been reviewed as follows.  Constitutional: Fever, weight gain, weight loss, appetite change, night sweats, fatigue, chills.  Eyes : blurry, double vision, vision, loss, tearing, redness, pain, sensitivity to light, glaucoma.  Ears, nose, mouth, and throat: Hearing loss, ringing in the ears, ear pain, nasal congestion, nasal drainage,  "nosebleeds, mouth, throat, irritation tooth problem.  Cardiovascular :chest pain, pressure, heart racing, palpitations, sweating, leg swelling, high or low blood pressure  Pulmonary: Cough, yellow or green sputum, blood and sputum, shortness of breath, wheezing  Gastrointestinal: Nausea, vomiting, diarrhea, constipation, pain, blood in stool, or vomitus, heartburn, difficulty swallowing  Genitourinary: incontinence, abnormal bleeding, abnormal discharge, urinary frequency, urinary hesitancy, pain, impotence sexual problem, infection, urinary retention  Musculoskeletal: Pain, stiffness, joint, redness or warmth, arthritis, back pain, weakness, muscle wasting, sprain or fracture  Neuro: Weight weakness, dizziness, change in voice, change in taste change in vision, change in hearing, loss, or change of sensation, trouble walking, balance problems coordination problems, shaking, speech problem  Endocrine , cold or heat intolerance, blood sugar problem, weight gain or loss missed periods hot flashes, sweats, change in body hair, change in libido, increased thirst, increased urination  Heme/lymph: Swelling, bleeding, problem anemia, bruising, enlarged lymph nodes  Allergic/immunologic: H. plus nasal drip, watery itchy eyes, nasal drainage, immunosuppressed  The above were reviewed and noted negative except as noted in HPI and Problem List.    Objective     /90 (BP Location: Left arm, Patient Position: Sitting, BP Cuff Size: Adult)   Pulse 84   Temp 36.6 °C (97.8 °F) (Temporal)   Resp 16   Ht 1.702 m (5' 7\")   Wt 72.6 kg (160 lb)   SpO2 97%   BMI 25.06 kg/m²      Physical Exam  Respiratory: Clear to auscultation, no wheezing, rales or rhonchi  Cardiovascular: Regular rate and rhythm, no murmurs, rubs, or gallops  Constitutional: Well developed, well nourished, alert and in no acute distress   Eyes: Normal external exam. Pupils equally round and reactive to light with normal accommodation and extraocular " movements intact.  Neck: Supple, no lymphadenopathy or masses.   Cardiovascular: Regular rate and rhythm, normal S1 and S2, no murmurs, gallops, or rubs. Radial pulses normal. No peripheral edema.  Pulmonary: No respiratory distress, lungs clear to auscultation bilaterally. No wheezes, rhonchi, rales.  Abdomen: soft,non tender, non distended, without masses or HSM  Skin: Warm, well perfused, normal skin turgor and color.   Neurologic: Cranial nerves II-XII grossly intact.   Psychiatric: Mood calm and affect normal  Musculoskeletal: Moving all extremities without restriction  The above were reviewed and noted negative except as noted in HPI and Problem List.      Results  Imaging   - Chest x-ray: 05/21/2025, Vague heterogeneous density in the right upper lobe laterally on the outside. Changes also noted in the right lung base, but no pneumothorax or pleural effusions are seen.         Assessment & Plan  1. Persistent cough.  - The etiology of the cough is likely due to pneumonia, potentially exacerbated by previous radiation treatment.  - Physical exam and chest x-ray findings indicate no evidence of malignancy, but changes and infiltrates are noted in the right lung base.  - A CT scan has been scheduled for 05/31/2025 to further investigate the cause of the cough. The patient's immune system is currently compromised, which may be contributing to the non-resolution of the pneumonia.  - A prescription for Omnicef has been provided, which the patient is advised to commence immediately. The results of the CT scan will be reviewed on 06/04/2025, at which point further treatment options will be discussed.    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, right    Relevant Orders    Follow Up In [facility] - PCP - Established    Immunodeficiency due to drugs (CODE)    Relevant Orders    Follow Up In [facility] - PCP - Established     Other Visit Diagnoses         Pneumonia of right middle lobe due to infectious organism        Relevant Medications    cefdinir (Omnicef) 300 mg capsule    Other Relevant Orders    Follow Up In [facility] - PCP - Established      Allergic rhinitis due to pollen, unspecified seasonality        Relevant Orders    Follow Up In [facility] - PCP - Established      Yeast vaginitis        Relevant Orders    Follow Up In [facility] - PCP - Established      Cancer of overlapping sites of right breast        Relevant Orders    Follow Up In [facility] - PCP - Established      HER2-negative carcinoma of right breast        Relevant Orders    Follow Up In [facility] - PCP - Established      Triple negative breast cancer        Relevant Orders    Follow Up In [facility] - PCP - Established      Malignant neoplasm involving both nipple and areola of right breast in female, estrogen receptor negative        Relevant Orders    Follow Up In [facility] - PCP - Established           Breast Cancer (Triple Negative Inflammatory)    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 She has completed chemotherapy, immunotherapy, radiation, and bilateral mastectomy. The cancer was located in the right lymph nodes. Genetic testing was negative for hereditary breast cancer markers.    - Ensure follow-up with oncologist as needed.    - Monitor for signs of recurrence.       General Health Maintenance    Advised to maintain general health through dietary measures to support gut health, especially in light of  antibiotic use.    - Encourage daily consumption of yogurt with active cultures.      Berry Lou MD       This medical note was created with the assistance of artificial intelligence (AI) for documentation purposes. The content has been reviewed and confirmed by the healthcare provider for accuracy and completeness. Patient consented to the use of audio recording and use of AI during their visit.

## 2025-06-09 ENCOUNTER — APPOINTMENT (OUTPATIENT)
Dept: PRIMARY CARE | Facility: CLINIC | Age: 55
End: 2025-06-09
Payer: COMMERCIAL

## 2025-06-09 DIAGNOSIS — F41.9 ANXIETY: ICD-10-CM

## 2025-06-09 DIAGNOSIS — D84.821 IMMUNODEFICIENCY DUE TO DRUGS (CODE): ICD-10-CM

## 2025-06-09 DIAGNOSIS — C50.811 CANCER OF OVERLAPPING SITES OF RIGHT BREAST: ICD-10-CM

## 2025-06-09 DIAGNOSIS — E78.5 DYSLIPIDEMIA: ICD-10-CM

## 2025-06-09 DIAGNOSIS — C50.911 HER2-NEGATIVE CARCINOMA OF RIGHT BREAST: ICD-10-CM

## 2025-06-09 DIAGNOSIS — E55.9 VITAMIN D DEFICIENCY: Primary | ICD-10-CM

## 2025-06-09 DIAGNOSIS — Z17.32 HER2-NEGATIVE CARCINOMA OF RIGHT BREAST: ICD-10-CM

## 2025-06-09 DIAGNOSIS — C50.011 MALIGNANT NEOPLASM INVOLVING BOTH NIPPLE AND AREOLA OF RIGHT BREAST IN FEMALE, ESTROGEN RECEPTOR NEGATIVE: ICD-10-CM

## 2025-06-09 DIAGNOSIS — F32.A DEPRESSION, UNSPECIFIED DEPRESSION TYPE: ICD-10-CM

## 2025-06-09 DIAGNOSIS — K21.9 GASTROESOPHAGEAL REFLUX DISEASE WITHOUT ESOPHAGITIS: ICD-10-CM

## 2025-06-09 DIAGNOSIS — J30.1 ALLERGIC RHINITIS DUE TO POLLEN, UNSPECIFIED SEASONALITY: ICD-10-CM

## 2025-06-09 DIAGNOSIS — C50.911 BREAST CANCER METASTASIZED TO AXILLARY LYMPH NODE, RIGHT: ICD-10-CM

## 2025-06-09 DIAGNOSIS — C50.919 TRIPLE NEGATIVE BREAST CANCER: ICD-10-CM

## 2025-06-09 DIAGNOSIS — J18.9 PNEUMONIA OF RIGHT MIDDLE LOBE DUE TO INFECTIOUS ORGANISM: ICD-10-CM

## 2025-06-09 DIAGNOSIS — B37.31 YEAST VAGINITIS: ICD-10-CM

## 2025-06-09 DIAGNOSIS — C77.3 BREAST CANCER METASTASIZED TO AXILLARY LYMPH NODE, RIGHT: ICD-10-CM

## 2025-06-09 DIAGNOSIS — Z17.421 TRIPLE NEGATIVE BREAST CANCER: ICD-10-CM

## 2025-06-09 DIAGNOSIS — Z17.1 MALIGNANT NEOPLASM INVOLVING BOTH NIPPLE AND AREOLA OF RIGHT BREAST IN FEMALE, ESTROGEN RECEPTOR NEGATIVE: ICD-10-CM

## 2025-06-09 PROCEDURE — 99214 OFFICE O/P EST MOD 30 MIN: CPT | Performed by: FAMILY MEDICINE

## 2025-06-09 ASSESSMENT — ENCOUNTER SYMPTOMS: COUGH: 1

## 2025-06-09 NOTE — PROGRESS NOTES
Subjective   Patient ID: Halle Rai is a 55 y.o. female who presents for Results (Discuss results of CT of the chest.) and Vitamin D Deficiency (Patient would like to know should she continue taking vitamin D 50,000 international units.).  History of Present Illness    See Above  Review of Systems  12 Systems have been reviewed as follows.  Constitutional: Fever, weight gain, weight loss, appetite change, night sweats, fatigue, chills.  Eyes : blurry, double vision, vision, loss, tearing, redness, pain, sensitivity to light, glaucoma.  Ears, nose, mouth, and throat: Hearing loss, ringing in the ears, ear pain, nasal congestion, nasal drainage, nosebleeds, mouth, throat, irritation tooth problem.  Cardiovascular :chest pain, pressure, heart racing, palpitations, sweating, leg swelling, high or low blood pressure  Pulmonary: Cough, yellow or green sputum, blood and sputum, shortness of breath, wheezing  Gastrointestinal: Nausea, vomiting, diarrhea, constipation, pain, blood in stool, or vomitus, heartburn, difficulty swallowing  Genitourinary: incontinence, abnormal bleeding, abnormal discharge, urinary frequency, urinary hesitancy, pain, impotence sexual problem, infection, urinary retention  Musculoskeletal: Pain, stiffness, joint, redness or warmth, arthritis, back pain, weakness, muscle wasting, sprain or fracture  Neuro: Weight weakness, dizziness, change in voice, change in taste change in vision, change in hearing, loss, or change of sensation, trouble walking, balance problems coordination problems, shaking, speech problem  Endocrine , cold or heat intolerance, blood sugar problem, weight gain or loss missed periods hot flashes, sweats, change in body hair, change in libido, increased thirst, increased urination  Heme/lymph: Swelling, bleeding, problem anemia, bruising, enlarged lymph nodes  Allergic/immunologic: H. plus nasal drip, watery itchy eyes, nasal drainage, immunosuppressed  The above were  reviewed and noted negative except as noted in HPI and Problem List.    Objective     There were no vitals taken for this visit.     Physical Exam    Constitutional: Well developed, well nourished, alert and in no acute distress   Eyes: Normal external exam. Pupils equally round and reactive to light with normal accommodation and extraocular movements intact.  Neck: Supple, no lymphadenopathy or masses.   Cardiovascular: Regular rate and rhythm, normal S1 and S2, no murmurs, gallops, or rubs. Radial pulses normal. No peripheral edema.  Pulmonary: No respiratory distress, lungs clear to auscultation bilaterally. No wheezes, rhonchi, rales.  Abdomen: soft,non tender, non distended, without masses or HSM  Skin: Warm, well perfused, normal skin turgor and color.   Neurologic: Cranial nerves II-XII grossly intact.   Psychiatric: Mood calm and affect normal  Musculoskeletal: Moving all extremities without restriction  The above were reviewed and noted negative except as noted in HPI and Problem List.      Results           Assessment & Plan      Problem List Items Addressed This Visit       Anxiety    Relevant Orders    Follow Up In [facility] - PCP - Established    Depression    Relevant Orders    Follow [facility] - PCP - Established    Gastroesophageal reflux disease    Relevant Orders    Follow [facility] - PCP - Established    Breast cancer metastasized to axillary lymph node, right    Relevant Orders    Follow [facility] - PCP - Established    Immunodeficiency due to drugs (CODE)    Relevant Orders    Follow [facility] - PCP - Established     Other Visit Diagnoses         Pneumonia of right middle lobe due to infectious organism        Relevant Orders    Follow Up [facility] - PCP - Established      Allergic rhinitis due to pollen, unspecified seasonality        Relevant Orders    Follow [facility] - PCP -  Established      Yeast vaginitis        Relevant Orders    Follow [facility] - PCP - Established      Cancer of overlapping sites of right breast        Relevant Orders    Follow [facility] - PCP - Established      HER2-negative carcinoma of right breast        Relevant Orders    Follow [facility] - PCP - Established      Triple negative breast cancer        Relevant Orders    Follow [facility] - PCP - Established      Malignant neoplasm involving both nipple and areola of right breast in female, estrogen receptor negative        Relevant Orders    Follow [facility] - PCP - Established           Vit D 2000 units QD    Cough improved    CXR in 6 weeks on Wednesday      BW prior    Repeat CT chest in 3 months 9/1/25 if not resolved    Breast Cancer (Triple Negative Inflammatory)    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 She has completed chemotherapy, immunotherapy, radiation, and bilateral mastectomy. The cancer was located in the right lymph nodes. Genetic testing was negative for hereditary breast cancer markers.    - Ensure follow-up with oncologist as needed.    - Monitor for signs of recurrence.       General Health Maintenance    Advised to maintain general health through dietary measures to support gut health, especially in light of antibiotic use.    - Encourage daily consumption of yogurt with active cultures.      Virtual or Telephone Consent    An interactive audio and video telecommunication system which permits real time communications between the patient (at the originating site) and provider (at the distant site) was utilized to provide this telehealth service.   Verbal consent was requested and obtained from Halle Rai on this date, 06/09/25 for a telehealth visit and the patient's location was confirmed at the  time of the visit.     Berry Lou MD       This medical note was created with the assistance of artificial intelligence (AI) for documentation purposes. The content has been reviewed and confirmed by the healthcare provider for accuracy and completeness. Patient consented to the use of audio recording and use of AI during their visit.   Answers submitted by the patient for this visit:  Cough Questionnaire (Submitted on 6/9/2025)  Chief Complaint: Cough  Chronicity: chronic  Onset: more than 1 month ago  Progression since onset: gradually improving  Frequency: every few hours  Cough characteristics: non-productive  ear congestion: Yes  Aggravated by: exercise

## 2025-06-16 ENCOUNTER — TELEMEDICINE (OUTPATIENT)
Dept: PRIMARY CARE | Facility: CLINIC | Age: 55
End: 2025-06-16
Payer: COMMERCIAL

## 2025-06-16 DIAGNOSIS — Z17.421 TRIPLE NEGATIVE BREAST CANCER: ICD-10-CM

## 2025-06-16 DIAGNOSIS — Z17.1 MALIGNANT NEOPLASM INVOLVING BOTH NIPPLE AND AREOLA OF RIGHT BREAST IN FEMALE, ESTROGEN RECEPTOR NEGATIVE: ICD-10-CM

## 2025-06-16 DIAGNOSIS — C50.911 BREAST CANCER METASTASIZED TO AXILLARY LYMPH NODE, RIGHT: ICD-10-CM

## 2025-06-16 DIAGNOSIS — F32.A DEPRESSION, UNSPECIFIED DEPRESSION TYPE: ICD-10-CM

## 2025-06-16 DIAGNOSIS — J40 BRONCHITIS: Primary | ICD-10-CM

## 2025-06-16 DIAGNOSIS — C50.811 CANCER OF OVERLAPPING SITES OF RIGHT BREAST: ICD-10-CM

## 2025-06-16 DIAGNOSIS — K21.9 GASTROESOPHAGEAL REFLUX DISEASE WITHOUT ESOPHAGITIS: ICD-10-CM

## 2025-06-16 DIAGNOSIS — Z17.32 HER2-NEGATIVE CARCINOMA OF RIGHT BREAST: ICD-10-CM

## 2025-06-16 DIAGNOSIS — B37.31 YEAST VAGINITIS: ICD-10-CM

## 2025-06-16 DIAGNOSIS — C50.911 HER2-NEGATIVE CARCINOMA OF RIGHT BREAST: ICD-10-CM

## 2025-06-16 DIAGNOSIS — D84.821 IMMUNODEFICIENCY DUE TO DRUGS (CODE): ICD-10-CM

## 2025-06-16 DIAGNOSIS — F41.9 ANXIETY: ICD-10-CM

## 2025-06-16 DIAGNOSIS — J18.9 PNEUMONIA OF RIGHT MIDDLE LOBE DUE TO INFECTIOUS ORGANISM: ICD-10-CM

## 2025-06-16 DIAGNOSIS — J30.1 ALLERGIC RHINITIS DUE TO POLLEN, UNSPECIFIED SEASONALITY: ICD-10-CM

## 2025-06-16 DIAGNOSIS — C50.919 TRIPLE NEGATIVE BREAST CANCER: ICD-10-CM

## 2025-06-16 DIAGNOSIS — C77.3 BREAST CANCER METASTASIZED TO AXILLARY LYMPH NODE, RIGHT: ICD-10-CM

## 2025-06-16 DIAGNOSIS — C50.011 MALIGNANT NEOPLASM INVOLVING BOTH NIPPLE AND AREOLA OF RIGHT BREAST IN FEMALE, ESTROGEN RECEPTOR NEGATIVE: ICD-10-CM

## 2025-06-16 PROCEDURE — 99214 OFFICE O/P EST MOD 30 MIN: CPT | Performed by: FAMILY MEDICINE

## 2025-06-16 RX ORDER — CEFDINIR 300 MG/1
300 CAPSULE ORAL 2 TIMES DAILY
Qty: 20 CAPSULE | Refills: 0 | Status: SHIPPED | OUTPATIENT
Start: 2025-06-16 | End: 2025-06-26

## 2025-06-16 NOTE — PROGRESS NOTES
Subjective   Patient ID: Halle Rai is a 55 y.o. female who presents for Cough (On going for 3 days. Pt did at home covid test 6/13/25 and it was neg), Sore Throat, and Fever.  History of Present Illness    See Above  Review of Systems  12 Systems have been reviewed as follows.  Constitutional: Fever, weight gain, weight loss, appetite change, night sweats, fatigue, chills.  Eyes : blurry, double vision, vision, loss, tearing, redness, pain, sensitivity to light, glaucoma.  Ears, nose, mouth, and throat: Hearing loss, ringing in the ears, ear pain, nasal congestion, nasal drainage, nosebleeds, mouth, throat, irritation tooth problem.  Cardiovascular :chest pain, pressure, heart racing, palpitations, sweating, leg swelling, high or low blood pressure  Pulmonary: Cough, yellow or green sputum, blood and sputum, shortness of breath, wheezing  Gastrointestinal: Nausea, vomiting, diarrhea, constipation, pain, blood in stool, or vomitus, heartburn, difficulty swallowing  Genitourinary: incontinence, abnormal bleeding, abnormal discharge, urinary frequency, urinary hesitancy, pain, impotence sexual problem, infection, urinary retention  Musculoskeletal: Pain, stiffness, joint, redness or warmth, arthritis, back pain, weakness, muscle wasting, sprain or fracture  Neuro: Weight weakness, dizziness, change in voice, change in taste change in vision, change in hearing, loss, or change of sensation, trouble walking, balance problems coordination problems, shaking, speech problem  Endocrine , cold or heat intolerance, blood sugar problem, weight gain or loss missed periods hot flashes, sweats, change in body hair, change in libido, increased thirst, increased urination  Heme/lymph: Swelling, bleeding, problem anemia, bruising, enlarged lymph nodes  Allergic/immunologic: H. plus nasal drip, watery itchy eyes, nasal drainage, immunosuppressed  The above were reviewed and noted negative except as noted in HPI and Problem  List.    Objective     There were no vitals taken for this visit.     Physical Exam    Constitutional: Well developed, well nourished, alert and in no acute distress   Eyes: Normal external exam. Pupils equally round and reactive to light with normal accommodation and extraocular movements intact.  Neck: Supple, no lymphadenopathy or masses.   Cardiovascular: Regular rate and rhythm, normal S1 and S2, no murmurs, gallops, or rubs. Radial pulses normal. No peripheral edema.  Pulmonary: No respiratory distress, lungs clear to auscultation bilaterally. No wheezes, rhonchi, rales.  Abdomen: soft,non tender, non distended, without masses or HSM  Skin: Warm, well perfused, normal skin turgor and color.   Neurologic: Cranial nerves II-XII grossly intact.   Psychiatric: Mood calm and affect normal  Musculoskeletal: Moving all extremities without restriction  The above were reviewed and noted negative except as noted in HPI and Problem List.      Results           Assessment & Plan      Problem List Items Addressed This Visit       Anxiety    Relevant Orders    Follow Up [facility] - PCP - Established    Depression    Relevant Orders    Follow [facility] - PCP - Established    Gastroesophageal reflux disease    Relevant Orders    Follow [facility] - PCP - Established    Breast cancer metastasized to axillary lymph node, right    Relevant Orders    Follow [facility] - PCP - Established    Immunodeficiency due to drugs (CODE)    Relevant Orders    Follow [facility] - PCP - Established     Other Visit Diagnoses         Bronchitis    -  Primary    Relevant Medications    cefdinir (Omnicef) 300 mg capsule    Other Relevant Orders    Follow Up [facility] - PCP - Established      Pneumonia of right middle lobe due to infectious organism        Relevant Orders    Follow Up [facility] - PCP - Established      Allergic  rhinitis due to pollen, unspecified seasonality        Relevant Orders    Follow Up [facility] - PCP - Established      Yeast vaginitis        Relevant Orders    Follow [facility] - PCP - Established      Cancer of overlapping sites of right breast        Relevant Orders    Follow [facility] - PCP - Established      HER2-negative carcinoma of right breast        Relevant Orders    Follow Up [facility] - PCP - Established      Triple negative breast cancer        Relevant Orders    Follow [facility] - PCP - Established      Malignant neoplasm involving both nipple and areola of right breast in female, estrogen receptor negative        Relevant Orders    Follow Up [facility] - PCP - Established         Virtual or Telephone Consent    An interactive audio and video telecommunication system which permits real time communications between the patient (at the originating site) and provider (at the distant site) was utilized to provide this telehealth service.   Verbal consent was requested and obtained from Halle Rai on this date, 06/16/25 for a telehealth visit and the patient's location was confirmed at the time of the visit.    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 She has completed chemotherapy, immunotherapy, radiation, and bilateral mastectomy. The cancer was located in the right lymph nodes. Genetic testing was negative for hereditary breast cancer markers.    - Ensure follow-up with oncologist as needed.    - Monitor for signs of recurrence.       General Health Maintenance    Advised to maintain general health through dietary  measures to support gut health, especially in light of antibiotic use.    - Encourage daily consumption of yogurt with active cultures.      Berry Lou MD       This medical note was created with the assistance of artificial intelligence (AI) for documentation purposes. The content has been reviewed and confirmed by the healthcare provider for accuracy and completeness. Patient consented to the use of audio recording and use of AI during their visit.

## 2025-07-14 LAB
25(OH)D3+25(OH)D2 SERPL-MCNC: 38 NG/ML (ref 30–100)
ALBUMIN SERPL-MCNC: 4.4 G/DL (ref 3.6–5.1)
ALP SERPL-CCNC: 63 U/L (ref 37–153)
ALT SERPL-CCNC: 12 U/L (ref 6–29)
ANION GAP SERPL CALCULATED.4IONS-SCNC: 9 MMOL/L (CALC) (ref 7–17)
AST SERPL-CCNC: 18 U/L (ref 10–35)
BASOPHILS # BLD AUTO: 21 CELLS/UL (ref 0–200)
BASOPHILS NFR BLD AUTO: 0.8 %
BILIRUB SERPL-MCNC: 0.4 MG/DL (ref 0.2–1.2)
BUN SERPL-MCNC: 22 MG/DL (ref 7–25)
CALCIUM SERPL-MCNC: 9.1 MG/DL (ref 8.6–10.4)
CHLORIDE SERPL-SCNC: 105 MMOL/L (ref 98–110)
CHOLEST SERPL-MCNC: 208 MG/DL
CHOLEST/HDLC SERPL: 3.5 (CALC)
CO2 SERPL-SCNC: 24 MMOL/L (ref 20–32)
CREAT SERPL-MCNC: 1.01 MG/DL (ref 0.5–1.03)
EGFRCR SERPLBLD CKD-EPI 2021: 65 ML/MIN/1.73M2
EOSINOPHIL # BLD AUTO: 426 CELLS/UL (ref 15–500)
EOSINOPHIL NFR BLD AUTO: 16.4 %
ERYTHROCYTE [DISTWIDTH] IN BLOOD BY AUTOMATED COUNT: 14.8 % (ref 11–15)
GLUCOSE SERPL-MCNC: 96 MG/DL (ref 65–99)
HCT VFR BLD AUTO: 32.8 % (ref 35–45)
HDLC SERPL-MCNC: 59 MG/DL
HGB BLD-MCNC: 10.4 G/DL (ref 11.7–15.5)
LDLC SERPL CALC-MCNC: 134 MG/DL (CALC)
LYMPHOCYTES # BLD AUTO: 559 CELLS/UL (ref 850–3900)
LYMPHOCYTES NFR BLD AUTO: 21.5 %
MCH RBC QN AUTO: 31.9 PG (ref 27–33)
MCHC RBC AUTO-ENTMCNC: 31.7 G/DL (ref 32–36)
MCV RBC AUTO: 100.6 FL (ref 80–100)
MONOCYTES # BLD AUTO: 255 CELLS/UL (ref 200–950)
MONOCYTES NFR BLD AUTO: 9.8 %
NEUTROPHILS # BLD AUTO: 1339 CELLS/UL (ref 1500–7800)
NEUTROPHILS NFR BLD AUTO: 51.5 %
NONHDLC SERPL-MCNC: 149 MG/DL (CALC)
PLATELET # BLD AUTO: 214 THOUSAND/UL (ref 140–400)
PMV BLD REES-ECKER: 9.2 FL (ref 7.5–12.5)
POTASSIUM SERPL-SCNC: 4.2 MMOL/L (ref 3.5–5.3)
PROT SERPL-MCNC: 6.7 G/DL (ref 6.1–8.1)
RBC # BLD AUTO: 3.26 MILLION/UL (ref 3.8–5.1)
SODIUM SERPL-SCNC: 138 MMOL/L (ref 135–146)
TRIGL SERPL-MCNC: 60 MG/DL
WBC # BLD AUTO: 2.6 THOUSAND/UL (ref 3.8–10.8)

## 2025-07-16 ENCOUNTER — APPOINTMENT (OUTPATIENT)
Dept: PRIMARY CARE | Facility: CLINIC | Age: 55
End: 2025-07-16
Payer: COMMERCIAL

## 2025-07-16 ENCOUNTER — HOSPITAL ENCOUNTER (OUTPATIENT)
Dept: RADIOLOGY | Facility: CLINIC | Age: 55
Discharge: HOME | End: 2025-07-16
Payer: COMMERCIAL

## 2025-07-16 VITALS
DIASTOLIC BLOOD PRESSURE: 78 MMHG | OXYGEN SATURATION: 95 % | BODY MASS INDEX: 25 KG/M2 | SYSTOLIC BLOOD PRESSURE: 128 MMHG | HEART RATE: 93 BPM | WEIGHT: 159.6 LBS

## 2025-07-16 DIAGNOSIS — Z17.421 TRIPLE NEGATIVE BREAST CANCER: ICD-10-CM

## 2025-07-16 DIAGNOSIS — J40 BRONCHITIS: ICD-10-CM

## 2025-07-16 DIAGNOSIS — F43.9 STRESS: ICD-10-CM

## 2025-07-16 DIAGNOSIS — J18.9 PNEUMONIA OF RIGHT UPPER LOBE DUE TO INFECTIOUS ORGANISM: ICD-10-CM

## 2025-07-16 DIAGNOSIS — K21.9 GASTROESOPHAGEAL REFLUX DISEASE WITHOUT ESOPHAGITIS: ICD-10-CM

## 2025-07-16 DIAGNOSIS — J18.9 PNEUMONIA OF RIGHT MIDDLE LOBE DUE TO INFECTIOUS ORGANISM: ICD-10-CM

## 2025-07-16 DIAGNOSIS — Z17.1 MALIGNANT NEOPLASM INVOLVING BOTH NIPPLE AND AREOLA OF RIGHT BREAST IN FEMALE, ESTROGEN RECEPTOR NEGATIVE: ICD-10-CM

## 2025-07-16 DIAGNOSIS — C50.911 HER2-NEGATIVE CARCINOMA OF RIGHT BREAST: ICD-10-CM

## 2025-07-16 DIAGNOSIS — C50.811 CANCER OF OVERLAPPING SITES OF RIGHT BREAST: ICD-10-CM

## 2025-07-16 DIAGNOSIS — C77.3 BREAST CANCER METASTASIZED TO AXILLARY LYMPH NODE, RIGHT: ICD-10-CM

## 2025-07-16 DIAGNOSIS — B35.4 TINEA CORPORIS: Primary | ICD-10-CM

## 2025-07-16 DIAGNOSIS — L30.8 OTHER ECZEMA: ICD-10-CM

## 2025-07-16 DIAGNOSIS — C50.919 TRIPLE NEGATIVE BREAST CANCER: ICD-10-CM

## 2025-07-16 DIAGNOSIS — J30.1 ALLERGIC RHINITIS DUE TO POLLEN, UNSPECIFIED SEASONALITY: ICD-10-CM

## 2025-07-16 DIAGNOSIS — B37.31 YEAST VAGINITIS: ICD-10-CM

## 2025-07-16 DIAGNOSIS — D84.821 IMMUNODEFICIENCY DUE TO DRUGS (CODE): ICD-10-CM

## 2025-07-16 DIAGNOSIS — C50.911 BREAST CANCER METASTASIZED TO AXILLARY LYMPH NODE, RIGHT: ICD-10-CM

## 2025-07-16 DIAGNOSIS — K21.9 GASTROESOPHAGEAL REFLUX DISEASE, UNSPECIFIED WHETHER ESOPHAGITIS PRESENT: ICD-10-CM

## 2025-07-16 DIAGNOSIS — Z17.32 HER2-NEGATIVE CARCINOMA OF RIGHT BREAST: ICD-10-CM

## 2025-07-16 DIAGNOSIS — C50.011 MALIGNANT NEOPLASM INVOLVING BOTH NIPPLE AND AREOLA OF RIGHT BREAST IN FEMALE, ESTROGEN RECEPTOR NEGATIVE: ICD-10-CM

## 2025-07-16 DIAGNOSIS — F41.9 ANXIETY: ICD-10-CM

## 2025-07-16 DIAGNOSIS — B00.9 HERPES SIMPLEX: ICD-10-CM

## 2025-07-16 DIAGNOSIS — F32.A DEPRESSION, UNSPECIFIED DEPRESSION TYPE: ICD-10-CM

## 2025-07-16 PROCEDURE — 71046 X-RAY EXAM CHEST 2 VIEWS: CPT | Performed by: RADIOLOGY

## 2025-07-16 PROCEDURE — 99214 OFFICE O/P EST MOD 30 MIN: CPT | Performed by: FAMILY MEDICINE

## 2025-07-16 PROCEDURE — 71046 X-RAY EXAM CHEST 2 VIEWS: CPT

## 2025-07-16 RX ORDER — VALACYCLOVIR HYDROCHLORIDE 1 G/1
2000 TABLET, FILM COATED ORAL 2 TIMES DAILY
Qty: 4 TABLET | Refills: 6 | Status: SHIPPED | OUTPATIENT
Start: 2025-07-16

## 2025-07-16 RX ORDER — FLUCONAZOLE 100 MG/1
100 TABLET ORAL DAILY
Qty: 14 TABLET | Refills: 0 | Status: SHIPPED | OUTPATIENT
Start: 2025-07-16 | End: 2025-07-30

## 2025-07-16 RX ORDER — BUSPIRONE HYDROCHLORIDE 10 MG/1
20 TABLET ORAL 2 TIMES DAILY
Qty: 180 TABLET | Refills: 1 | Status: SHIPPED | OUTPATIENT
Start: 2025-07-16

## 2025-07-16 RX ORDER — TRIAMCINOLONE ACETONIDE 1 MG/G
CREAM TOPICAL 2 TIMES DAILY
Qty: 30 G | Refills: 0 | Status: SHIPPED | OUTPATIENT
Start: 2025-07-16

## 2025-07-16 RX ORDER — PANTOPRAZOLE SODIUM 40 MG/1
40 TABLET, DELAYED RELEASE ORAL
Qty: 90 TABLET | Refills: 1 | Status: SHIPPED | OUTPATIENT
Start: 2025-07-16

## 2025-07-16 RX ORDER — BUPROPION HYDROCHLORIDE 300 MG/1
300 TABLET ORAL EVERY MORNING
Qty: 90 TABLET | Refills: 5 | Status: SHIPPED | OUTPATIENT
Start: 2025-07-16

## 2025-07-16 NOTE — PROGRESS NOTES
Subjective   Patient ID: Halle Rai is a 55 y.o. female who presents for Follow-up (CT of chest tomorrow morning./Mole like located on her back area. //Review lab work and Xray of chest with PCP. //Pt c/o bad odor when she rubs her nose not any other time. ).  History of Present Illness  The patient presents for evaluation of abnormal blood work, nasal odor, and a skin lesion on her back.    She is currently in the process of applying for disability due to her health conditions. She has been employed for 37 years and is considering [employer] but is concerned about losing her insurance coverage. She plans to maintain her insurance through [employer] until 10/2025. She is also contemplating a part-time job to supplement her income. She has not been granted FMLA leave but can take unpaid time off. She is under the care of Dr. Bermudez, a medical oncologist at [facility], who has declared her cancer-free. However, she is seeking a referral back to him due to her abnormal blood work results. Her last immunotherapy treatment was in 02/2025.    She is experiencing an unusual smell in one nostril, which she describes as similar to dirty feet or a fungal odor. She reports no pain associated with this symptom.    She has noticed a small spot on her back that itches persistently. She has attempted to alleviate the itch by scratching with a fingernail file.    She is concerned about her recent blood work results, particularly her vitamin D levels, which she believes are low. She is not currently seeing a hematologist. She is not taking any steroids or antibiotics at present. She is curious if her chemotherapy treatment could have affected her bone marrow.    Marital Status:   See Above  Review of Systems  12 Systems have been reviewed as follows.  Constitutional: Fever, weight gain, weight loss, appetite change, night sweats, fatigue, chills.  Eyes : blurry, double vision, vision, loss, tearing, redness, pain,  sensitivity to light, glaucoma.  Ears, nose, mouth, and throat: Hearing loss, ringing in the ears, ear pain, nasal congestion, nasal drainage, nosebleeds, mouth, throat, irritation tooth problem.  Cardiovascular :chest pain, pressure, heart racing, palpitations, sweating, leg swelling, high or low blood pressure  Pulmonary: Cough, yellow or green sputum, blood and sputum, shortness of breath, wheezing  Gastrointestinal: Nausea, vomiting, diarrhea, constipation, pain, blood in stool, or vomitus, heartburn, difficulty swallowing  Genitourinary: incontinence, abnormal bleeding, abnormal discharge, urinary frequency, urinary hesitancy, pain, impotence sexual problem, infection, urinary retention  Musculoskeletal: Pain, stiffness, joint, redness or warmth, arthritis, back pain, weakness, muscle wasting, sprain or fracture  Neuro: Weight weakness, dizziness, change in voice, change in taste change in vision, change in hearing, loss, or change of sensation, trouble walking, balance problems coordination problems, shaking, speech problem  Endocrine , cold or heat intolerance, blood sugar problem, weight gain or loss missed periods hot flashes, sweats, change in body hair, change in libido, increased thirst, increased urination  Heme/lymph: Swelling, bleeding, problem anemia, bruising, enlarged lymph nodes  Allergic/immunologic: H. plus nasal drip, watery itchy eyes, nasal drainage, immunosuppressed  The above were reviewed and noted negative except as noted in HPI and Problem List.    Objective     /78   Pulse 93   Wt 72.4 kg (159 lb 9.6 oz)   SpO2 95%   BMI 25.00 kg/m²      Physical Exam  Respiratory: Clear to auscultation, no wheezing, rales or rhonchi  Cardiovascular: Regular rate and rhythm, no murmurs, rubs, or gallops  Gastrointestinal: Soft, no tenderness, no distention, no masses  Skin: Small itchy spot on the back, no signs of malignancy, prescribed cream for itching  Constitutional: Well developed, well  nourished, alert and in no acute distress   Eyes: Normal external exam. Pupils equally round and reactive to light with normal accommodation and extraocular movements intact.  Neck: Supple, no lymphadenopathy or masses.   Cardiovascular: Regular rate and rhythm, normal S1 and S2, no murmurs, gallops, or rubs. Radial pulses normal. No peripheral edema.  Pulmonary: No respiratory distress, lungs clear to auscultation bilaterally. No wheezes, rhonchi, rales.  Abdomen: soft,non tender, non distended, without masses or HSM  Skin: Warm, well perfused, normal skin turgor and color.   Neurologic: Cranial nerves II-XII grossly intact.   Psychiatric: Mood calm and affect normal  Musculoskeletal: Moving all extremities without restriction  The above were reviewed and noted negative except as noted in HPI and Problem List.      Results  Labs   - Vitamin D level: Normal   - Liver function test: Normal   - CBC: White blood cell count improved. Anemia improved.   - Cholesterol: 208    Imaging   - Chest x-ray of the right upper lobe: 07/16/2025, Resolving pneumonia         Assessment & Plan  1. Abnormal blood work.  - White blood cell count has slightly decreased, while neutrophil count has increased.  - Hemoglobin levels have shown a minor decrease.  - Referral to a hematologist/oncologist will be made for further evaluation.  - Repeat chest x-ray will be scheduled in 1 month.    2. Nasal odor.  - Reports a foul smell in the nostril, potentially due to a fungal infection from previous antibiotic use.  - No pain associated with the odor.  - A 10-day course of antifungal medication will be prescribed.    3. Skin lesion on the back.  - Lesion does not exhibit signs of malignancy and appears to be due to scratching.  - Steroid cream will be provided for application twice daily to alleviate itching.  - No biopsy indicated at this time.    4. Pneumonia  - Advised to cancel the upcoming CT scan due to radiation exposure and improving  chest x-ray results.  - Chest x-ray reveals resolving pneumonia in the right upper lobe.  - Repeat chest x-ray to be done prior to the next visit.    Follow-up  - Follow up in 1 month.    Problem List Items Addressed This Visit       Anxiety    Relevant Orders    Follow Up [facility] - PCP - Established    Depression    Relevant Medications    buPROPion XL (Wellbutrin XL) 300 mg 24 hr tablet    busPIRone (Buspar) 10 mg tablet    Other Relevant Orders    Follow Up [facility] - PCP - Established    Herpes simplex    Relevant Medications    valACYclovir (Valtrex) 1 gram tablet    Other Relevant Orders    Follow Up [facility] - PCP - Established    Gastroesophageal reflux disease    Relevant Medications    busPIRone (Buspar) 10 mg tablet    pantoprazole (ProtoNix) 40 mg EC tablet    Other Relevant Orders    Follow Up [facility] - PCP - Established    Follow [facility] - PCP - Established    Breast cancer metastasized to axillary lymph node, right    Relevant Orders    Follow [facility] - PCP - Established    Immunodeficiency due to drugs (CODE)    Relevant Orders    Follow [facility] - PCP - Established     Other Visit Diagnoses         Tinea corporis    -  Primary    Relevant Medications    fluconazole (Diflucan) 100 mg tablet    Other Relevant Orders    Follow Up [facility] - PCP - Established      Pneumonia of right middle lobe due to infectious organism        Relevant Orders    Follow Up [facility] - PCP - Established      Allergic rhinitis due to pollen, unspecified seasonality        Relevant Orders    Follow Up [facility] - PCP - Established      Yeast vaginitis        Relevant Orders    Follow Up [facility] - PCP - Established      Cancer of overlapping sites of right breast        Relevant Orders    Follow Up [facility] - PCP -  Established      HER2-negative carcinoma of right breast        Relevant Orders    Referral To Hematology and Oncology    Follow Up [facility] - PCP - Established      Triple negative breast cancer        Relevant Orders    Follow [facility] - PCP - Established      Malignant neoplasm involving both nipple and areola of right breast in female, estrogen receptor negative        Relevant Orders    Follow Up [facility] - PCP - Established      Bronchitis        Relevant Orders    Follow Up [facility] - PCP - Established      Stress        Relevant Medications    buPROPion XL (Wellbutrin XL) 300 mg 24 hr tablet    Other Relevant Orders    Follow Up [facility] - PCP - Established      Other eczema        Relevant Medications    triamcinolone (Kenalog) 0.1 % cream    Other Relevant Orders    Follow Up [facility] - PCP - Established      Pneumonia of right upper lobe due to infectious organism        Relevant Orders    XR chest 2 views          Vit D 2000 units QD     Cough improved     CXR in 4 weeks on Wednesday      BW prior        Breast Cancer (Triple Negative Inflammatory)    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 She has completed chemotherapy, immunotherapy, radiation, and bilateral mastectomy. The cancer was located in the right lymph nodes. Genetic testing was negative for hereditary breast cancer markers.    - Ensure follow-up with oncologist as needed.    - Monitor for signs of recurrence.       General Health Maintenance    Advised to maintain general health through dietary measures to support gut health, especially in light of antibiotic use.    - Encourage daily consumption of yogurt with active cultures.       Berry Lou MD       This medical note was created with the assistance of artificial intelligence (AI) for  documentation purposes. The content has been reviewed and confirmed by the healthcare provider for accuracy and completeness. Patient consented to the use of audio recording and use of AI during their visit.

## 2025-07-17 ENCOUNTER — APPOINTMENT (OUTPATIENT)
Facility: HOSPITAL | Age: 55
End: 2025-07-17
Payer: COMMERCIAL

## 2025-08-20 ENCOUNTER — APPOINTMENT (OUTPATIENT)
Dept: PRIMARY CARE | Facility: CLINIC | Age: 55
End: 2025-08-20
Payer: COMMERCIAL

## 2025-08-20 VITALS
HEART RATE: 78 BPM | WEIGHT: 156 LBS | TEMPERATURE: 97.6 F | OXYGEN SATURATION: 99 % | DIASTOLIC BLOOD PRESSURE: 68 MMHG | HEIGHT: 67 IN | SYSTOLIC BLOOD PRESSURE: 100 MMHG | BODY MASS INDEX: 24.48 KG/M2

## 2025-08-20 DIAGNOSIS — B37.31 YEAST VAGINITIS: ICD-10-CM

## 2025-08-20 DIAGNOSIS — M77.8 TENDONITIS OF SHOULDER, RIGHT: Primary | ICD-10-CM

## 2025-08-20 DIAGNOSIS — B35.4 TINEA CORPORIS: ICD-10-CM

## 2025-08-20 DIAGNOSIS — B00.9 HERPES SIMPLEX: ICD-10-CM

## 2025-08-20 DIAGNOSIS — C50.919 TRIPLE NEGATIVE BREAST CANCER: ICD-10-CM

## 2025-08-20 DIAGNOSIS — L30.8 OTHER ECZEMA: ICD-10-CM

## 2025-08-20 DIAGNOSIS — C50.811 CANCER OF OVERLAPPING SITES OF RIGHT BREAST: ICD-10-CM

## 2025-08-20 DIAGNOSIS — Z17.421 TRIPLE NEGATIVE BREAST CANCER: ICD-10-CM

## 2025-08-20 DIAGNOSIS — F41.9 ANXIETY: ICD-10-CM

## 2025-08-20 DIAGNOSIS — C50.911 HER2-NEGATIVE CARCINOMA OF RIGHT BREAST: ICD-10-CM

## 2025-08-20 DIAGNOSIS — J30.1 ALLERGIC RHINITIS DUE TO POLLEN, UNSPECIFIED SEASONALITY: ICD-10-CM

## 2025-08-20 DIAGNOSIS — J18.9 PNEUMONIA OF RIGHT MIDDLE LOBE DUE TO INFECTIOUS ORGANISM: ICD-10-CM

## 2025-08-20 DIAGNOSIS — F43.10 PTSD (POST-TRAUMATIC STRESS DISORDER): ICD-10-CM

## 2025-08-20 DIAGNOSIS — C50.911 BREAST CANCER METASTASIZED TO AXILLARY LYMPH NODE, RIGHT: ICD-10-CM

## 2025-08-20 DIAGNOSIS — C50.011 MALIGNANT NEOPLASM INVOLVING BOTH NIPPLE AND AREOLA OF RIGHT BREAST IN FEMALE, ESTROGEN RECEPTOR NEGATIVE: ICD-10-CM

## 2025-08-20 DIAGNOSIS — C77.3 BREAST CANCER METASTASIZED TO AXILLARY LYMPH NODE, RIGHT: ICD-10-CM

## 2025-08-20 DIAGNOSIS — Z17.32 HER2-NEGATIVE CARCINOMA OF RIGHT BREAST: ICD-10-CM

## 2025-08-20 DIAGNOSIS — F32.A DEPRESSION, UNSPECIFIED DEPRESSION TYPE: ICD-10-CM

## 2025-08-20 DIAGNOSIS — J40 BRONCHITIS: ICD-10-CM

## 2025-08-20 DIAGNOSIS — D84.821 IMMUNODEFICIENCY DUE TO DRUGS (CODE): ICD-10-CM

## 2025-08-20 DIAGNOSIS — K21.9 GASTROESOPHAGEAL REFLUX DISEASE WITHOUT ESOPHAGITIS: ICD-10-CM

## 2025-08-20 DIAGNOSIS — Z17.1 MALIGNANT NEOPLASM INVOLVING BOTH NIPPLE AND AREOLA OF RIGHT BREAST IN FEMALE, ESTROGEN RECEPTOR NEGATIVE: ICD-10-CM

## 2025-08-20 DIAGNOSIS — K21.9 GASTROESOPHAGEAL REFLUX DISEASE, UNSPECIFIED WHETHER ESOPHAGITIS PRESENT: ICD-10-CM

## 2025-08-20 DIAGNOSIS — F43.9 STRESS: ICD-10-CM

## 2025-08-20 PROBLEM — D64.81 ANTINEOPLASTIC CHEMOTHERAPY INDUCED ANEMIA: Status: ACTIVE | Noted: 2024-08-27

## 2025-08-20 PROBLEM — Z90.13 HISTORY OF BILATERAL MASTECTOMY: Status: ACTIVE | Noted: 2025-08-11

## 2025-08-20 PROBLEM — T45.1X5A ANTINEOPLASTIC CHEMOTHERAPY INDUCED ANEMIA: Status: ACTIVE | Noted: 2024-08-27

## 2025-08-20 PROBLEM — J70.0: Status: ACTIVE | Noted: 2025-08-11

## 2025-08-20 PROCEDURE — 99214 OFFICE O/P EST MOD 30 MIN: CPT | Performed by: FAMILY MEDICINE

## 2025-08-20 RX ORDER — ALBUTEROL SULFATE 90 UG/1
2 INHALANT RESPIRATORY (INHALATION) EVERY 6 HOURS PRN
COMMUNITY
Start: 2025-08-11

## 2025-08-20 RX ORDER — FLUTICASONE PROPIONATE 50 MCG
1 SPRAY, SUSPENSION (ML) NASAL
COMMUNITY
Start: 2025-08-11

## 2025-08-20 RX ORDER — DICLOFENAC SODIUM 75 MG/1
75 TABLET, DELAYED RELEASE ORAL 2 TIMES DAILY PRN
Qty: 60 TABLET | Refills: 1 | Status: SHIPPED | OUTPATIENT
Start: 2025-08-20 | End: 2025-10-19

## 2025-08-20 RX ORDER — BUPROPION HYDROCHLORIDE 150 MG/1
150 TABLET ORAL EVERY MORNING
Qty: 30 TABLET | Refills: 1 | Status: SHIPPED | OUTPATIENT
Start: 2025-08-20 | End: 2025-10-19

## 2025-09-29 ENCOUNTER — APPOINTMENT (OUTPATIENT)
Dept: PRIMARY CARE | Facility: CLINIC | Age: 55
End: 2025-09-29
Payer: COMMERCIAL